# Patient Record
Sex: MALE | Race: WHITE | NOT HISPANIC OR LATINO | Employment: FULL TIME | URBAN - METROPOLITAN AREA
[De-identification: names, ages, dates, MRNs, and addresses within clinical notes are randomized per-mention and may not be internally consistent; named-entity substitution may affect disease eponyms.]

---

## 2023-05-24 ENCOUNTER — TELEPHONE (OUTPATIENT)
Dept: PRIMARY CARE | Facility: CLINIC | Age: 58
End: 2023-05-24
Payer: COMMERCIAL

## 2023-05-25 NOTE — TELEPHONE ENCOUNTER
Patient had pouchoscopy completed due to colectomy history. Records received from [facility] and chart updated

## 2023-06-06 ENCOUNTER — OFFICE VISIT (OUTPATIENT)
Dept: PRIMARY CARE | Facility: CLINIC | Age: 58
End: 2023-06-06
Payer: COMMERCIAL

## 2023-06-06 VITALS
HEIGHT: 67 IN | WEIGHT: 140 LBS | TEMPERATURE: 97.5 F | DIASTOLIC BLOOD PRESSURE: 84 MMHG | HEART RATE: 65 BPM | BODY MASS INDEX: 21.97 KG/M2 | SYSTOLIC BLOOD PRESSURE: 118 MMHG | OXYGEN SATURATION: 99 %

## 2023-06-06 DIAGNOSIS — K51.00 ULCERATIVE PANCOLITIS WITHOUT COMPLICATION (MULTI): Primary | ICD-10-CM

## 2023-06-06 DIAGNOSIS — M67.442 DIGITAL MUCINOUS CYST OF FINGER OF LEFT HAND: ICD-10-CM

## 2023-06-06 PROBLEM — R20.2 PARESTHESIA OF LEFT ARM: Status: ACTIVE | Noted: 2023-06-06

## 2023-06-06 PROBLEM — R21 RASH: Status: ACTIVE | Noted: 2023-06-06

## 2023-06-06 PROBLEM — F41.9 ANXIETY DISORDER: Status: ACTIVE | Noted: 2023-06-06

## 2023-06-06 PROBLEM — D64.9 ABSOLUTE ANEMIA: Status: ACTIVE | Noted: 2023-06-06

## 2023-06-06 PROBLEM — N52.9 ERECTILE DYSFUNCTION: Status: ACTIVE | Noted: 2023-06-06

## 2023-06-06 PROBLEM — G47.00 INSOMNIA: Status: ACTIVE | Noted: 2023-06-06

## 2023-06-06 PROBLEM — J01.90 ACUTE SINUSITIS: Status: RESOLVED | Noted: 2023-06-06 | Resolved: 2023-06-06

## 2023-06-06 PROBLEM — R63.0 POOR APPETITE: Status: ACTIVE | Noted: 2023-06-06

## 2023-06-06 PROBLEM — R97.20 ELEVATED PSA: Status: ACTIVE | Noted: 2022-12-07

## 2023-06-06 PROCEDURE — 20600 DRAIN/INJ JOINT/BURSA W/O US: CPT | Performed by: FAMILY MEDICINE

## 2023-06-06 PROCEDURE — 99212 OFFICE O/P EST SF 10 MIN: CPT | Performed by: FAMILY MEDICINE

## 2023-06-06 RX ORDER — ESCITALOPRAM OXALATE 10 MG/1
10 TABLET ORAL
COMMUNITY
Start: 2017-03-02

## 2023-06-06 RX ORDER — CLOCORTOLONE PIVALATE 0 G/G
CREAM TOPICAL
COMMUNITY
Start: 2020-02-05

## 2023-06-06 RX ORDER — TRAZODONE HYDROCHLORIDE 50 MG/1
50-100 TABLET ORAL NIGHTLY PRN
COMMUNITY
End: 2023-08-29

## 2023-06-06 RX ORDER — SILDENAFIL 100 MG/1
TABLET, FILM COATED ORAL
COMMUNITY
Start: 2021-02-19 | End: 2023-11-24 | Stop reason: SDUPTHER

## 2023-06-06 RX ORDER — CELECOXIB 200 MG/1
200 CAPSULE ORAL DAILY PRN
COMMUNITY
End: 2023-06-14

## 2023-06-06 RX ORDER — ETANERCEPT 50 MG/ML
SOLUTION SUBCUTANEOUS
COMMUNITY
Start: 2015-08-12

## 2023-06-06 RX ORDER — MIRTAZAPINE 45 MG/1
1 TABLET, FILM COATED ORAL NIGHTLY
COMMUNITY
Start: 2017-06-15

## 2023-06-06 RX ORDER — ZOLPIDEM TARTRATE 10 MG/1
1 TABLET ORAL NIGHTLY PRN
COMMUNITY
Start: 2017-09-25

## 2023-06-06 RX ORDER — MULTIVITAMIN
TABLET ORAL
COMMUNITY
Start: 2007-08-15

## 2023-06-06 ASSESSMENT — ENCOUNTER SYMPTOMS
FATIGUE: 0
DIZZINESS: 0
JOINT SWELLING: 0
FREQUENCY: 0
CONFUSION: 0
ABDOMINAL PAIN: 0
UNEXPECTED WEIGHT CHANGE: 0
DYSURIA: 0
SHORTNESS OF BREATH: 0
COUGH: 0
WEAKNESS: 0
SORE THROAT: 0
DECREASED CONCENTRATION: 0
NUMBNESS: 0
FEVER: 0
DIARRHEA: 0
BLOOD IN STOOL: 0
HEADACHES: 0
NAUSEA: 0
VOMITING: 0
PALPITATIONS: 0
HALLUCINATIONS: 0
EYE PAIN: 0
HEMATURIA: 0
TROUBLE SWALLOWING: 0

## 2023-06-06 ASSESSMENT — PAIN SCALES - GENERAL: PAINLEVEL: 2

## 2023-06-06 NOTE — PROGRESS NOTES
Subjective   Patient ID: Rj Adrian is a 58 y.o. male.    Patient with pain at the distal interphalangeal joint of the left index finger.  He thinks he injured it a few months ago and now there is a lump over the joint.  It is somewhat painful to move.  No swelling or redness or fever.  History of ulcerative colitis.  Under good control.  Scope is current.        Review of Systems   Constitutional:  Negative for fatigue, fever and unexpected weight change.   HENT:  Negative for congestion, ear pain, hearing loss, sore throat and trouble swallowing.    Eyes:  Negative for pain and visual disturbance.   Respiratory:  Negative for cough and shortness of breath.    Cardiovascular:  Negative for chest pain, palpitations and leg swelling.   Gastrointestinal:  Negative for abdominal pain, blood in stool, diarrhea, nausea and vomiting.   Genitourinary:  Negative for dysuria, frequency, hematuria and urgency.   Musculoskeletal:  Negative for joint swelling.   Skin:  Negative for pallor and rash.   Neurological:  Negative for dizziness, syncope, weakness, numbness and headaches.   Psychiatric/Behavioral:  Negative for confusion, decreased concentration, hallucinations and suicidal ideas.      Vitals:    06/06/23 1315   BP: 118/84   Pulse: 65   Temp: 36.4 °C (97.5 °F)   SpO2: 99%      Objective   Physical Exam  Constitutional:       Appearance: Normal appearance. He is normal weight.   Musculoskeletal:      Comments: Left index finger with small nodule over the DIP.  Cleaned with alcohol, injected with 2% lidocaine without epinephrine, inserted an 18-gauge needle and aspirated a scant amount of viscous clear fluid.  1/8 of a cc of Kenalog was injected and finger wrapped tightly   Neurological:      Mental Status: He is alert.         Assessment/Plan   Diagnoses and all orders for this visit:  Ulcerative pancolitis without complication (CMS/HCC)

## 2023-06-06 NOTE — PATIENT INSTRUCTIONS
Keep bandage and pressure over distal finger for the rest of the day.  Avoid overuse.  Hopefully the Kenalog will settle down the inflammation, pain and you will not get any more fluid in there.  If so let me know and we can further evaluate.  Follow-up with GI for inflammatory bowel disorder.  Diet as tolerated.

## 2023-06-08 PROBLEM — M67.442 DIGITAL MUCINOUS CYST OF FINGER OF LEFT HAND: Status: ACTIVE | Noted: 2023-06-08

## 2023-06-08 RX ORDER — TRIAMCINOLONE ACETONIDE 40 MG/ML
5 INJECTION, SUSPENSION INTRA-ARTICULAR; INTRAMUSCULAR ONCE
Status: COMPLETED | OUTPATIENT
Start: 2023-06-08 | End: 2023-06-08

## 2023-06-08 RX ADMIN — TRIAMCINOLONE ACETONIDE 5.2 MG: 40 INJECTION, SUSPENSION INTRA-ARTICULAR; INTRAMUSCULAR at 10:54

## 2023-06-13 DIAGNOSIS — R20.2 PARESTHESIA OF SKIN: ICD-10-CM

## 2023-06-14 RX ORDER — CELECOXIB 200 MG/1
CAPSULE ORAL
Qty: 90 CAPSULE | Refills: 3 | Status: SHIPPED | OUTPATIENT
Start: 2023-06-14

## 2023-06-16 ENCOUNTER — OFFICE VISIT (OUTPATIENT)
Dept: PRIMARY CARE | Facility: CLINIC | Age: 58
End: 2023-06-16
Payer: COMMERCIAL

## 2023-06-16 DIAGNOSIS — K91.850 POUCHITIS (MULTI): Primary | ICD-10-CM

## 2023-06-16 PROCEDURE — 99212 OFFICE O/P EST SF 10 MIN: CPT | Performed by: INTERNAL MEDICINE

## 2023-06-16 RX ORDER — CIPROFLOXACIN 500 MG/1
500 TABLET ORAL 2 TIMES DAILY
Qty: 14 TABLET | Refills: 0 | Status: SHIPPED | OUTPATIENT
Start: 2023-06-16 | End: 2023-06-23

## 2023-06-23 ASSESSMENT — ENCOUNTER SYMPTOMS
EYE REDNESS: 0
NAUSEA: 0
PHOTOPHOBIA: 0
NERVOUS/ANXIOUS: 0
APPETITE CHANGE: 0
WHEEZING: 0
BACK PAIN: 0
HEADACHES: 0
PALPITATIONS: 0
MYALGIAS: 0
VOMITING: 0
POLYDIPSIA: 0
POLYPHAGIA: 0
FREQUENCY: 0
HALLUCINATIONS: 0
RHINORRHEA: 0
EYE DISCHARGE: 0
COUGH: 0
DYSURIA: 0
CHEST TIGHTNESS: 0
SINUS PRESSURE: 0
ARTHRALGIAS: 0
BRUISES/BLEEDS EASILY: 0
COLOR CHANGE: 0
FLANK PAIN: 0
FEVER: 0
CONSTIPATION: 0
ABDOMINAL DISTENTION: 0
ACTIVITY CHANGE: 0
BLOOD IN STOOL: 0
NECK STIFFNESS: 0
CONFUSION: 0
NUMBNESS: 0
DIZZINESS: 0
SHORTNESS OF BREATH: 0
DIFFICULTY URINATING: 0
WEAKNESS: 0
TROUBLE SWALLOWING: 0
ABDOMINAL PAIN: 0
DIARRHEA: 0
FATIGUE: 0

## 2023-06-23 NOTE — PROGRESS NOTES
Telemedicine visit is conducted with the patient with his/ her consent about the medical problem as documented below.   Communication with the patient is over the telephone as per his request.    Subjective   Patient ID: Rj Adrian is a 58 y.o. male who called office for Pouchitis.     HPI   Patient had total colectomy in the past and keep getting pouchitis and he needed antibiotic every time and its been going on from several years.    Review of Systems   Constitutional:  Negative for activity change, appetite change, fatigue and fever.   HENT:  Negative for congestion, ear pain, hearing loss, rhinorrhea, sinus pressure and trouble swallowing.    Eyes:  Negative for photophobia, discharge, redness and visual disturbance.   Respiratory:  Negative for cough, chest tightness, shortness of breath and wheezing.    Cardiovascular:  Negative for chest pain, palpitations and leg swelling.   Gastrointestinal:  Negative for abdominal distention, abdominal pain, blood in stool, constipation, diarrhea, nausea and vomiting.   Endocrine: Negative for polydipsia, polyphagia and polyuria.   Genitourinary:  Negative for difficulty urinating, dysuria, flank pain, frequency and urgency.   Musculoskeletal:  Negative for arthralgias, back pain, myalgias and neck stiffness.   Skin:  Negative for color change and rash.   Allergic/Immunologic: Negative for immunocompromised state.   Neurological:  Negative for dizziness, syncope, weakness, numbness and headaches.   Hematological:  Does not bruise/bleed easily.   Psychiatric/Behavioral:  Negative for behavioral problems, confusion, hallucinations and suicidal ideas. The patient is not nervous/anxious.      Objective   There were no vitals taken for this visit.    Physical Exam    Assessment/Plan   Problem List Items Addressed This Visit    None  Visit Diagnoses       Pouchitis (CMS/[facility])    -  Primary    Relevant Medications    ciprofloxacin (Cipro) 500 mg tablet

## 2023-08-28 DIAGNOSIS — G47.00 INSOMNIA, UNSPECIFIED: ICD-10-CM

## 2023-08-29 RX ORDER — TRAZODONE HYDROCHLORIDE 50 MG/1
50-100 TABLET ORAL NIGHTLY PRN
Qty: 180 TABLET | Refills: 3 | Status: SHIPPED | OUTPATIENT
Start: 2023-08-29

## 2023-10-04 DIAGNOSIS — M45.9 ANKYLOSING SPONDYLITIS, UNSPECIFIED SITE OF SPINE (MULTI): ICD-10-CM

## 2023-11-22 DIAGNOSIS — N52.9 MALE ERECTILE DYSFUNCTION, UNSPECIFIED: ICD-10-CM

## 2023-11-24 RX ORDER — SILDENAFIL 100 MG/1
TABLET, FILM COATED ORAL
Qty: 30 TABLET | Refills: 4 | Status: SHIPPED | OUTPATIENT
Start: 2023-11-24

## 2023-12-01 ENCOUNTER — OFFICE VISIT (OUTPATIENT)
Dept: PRIMARY CARE | Facility: CLINIC | Age: 58
End: 2023-12-01
Payer: COMMERCIAL

## 2023-12-01 VITALS
HEIGHT: 67 IN | TEMPERATURE: 97.2 F | OXYGEN SATURATION: 97 % | SYSTOLIC BLOOD PRESSURE: 118 MMHG | DIASTOLIC BLOOD PRESSURE: 76 MMHG | HEART RATE: 75 BPM | BODY MASS INDEX: 22.6 KG/M2 | WEIGHT: 144 LBS

## 2023-12-01 DIAGNOSIS — K51.00 ULCERATIVE PANCOLITIS WITHOUT COMPLICATION (MULTI): ICD-10-CM

## 2023-12-01 DIAGNOSIS — J01.00 ACUTE NON-RECURRENT MAXILLARY SINUSITIS: ICD-10-CM

## 2023-12-01 DIAGNOSIS — M45.9 ANKYLOSING SPONDYLITIS, UNSPECIFIED SITE OF SPINE (MULTI): ICD-10-CM

## 2023-12-01 DIAGNOSIS — Z00.00 ROUTINE GENERAL MEDICAL EXAMINATION AT A HEALTH CARE FACILITY: Primary | ICD-10-CM

## 2023-12-01 PROCEDURE — 99396 PREV VISIT EST AGE 40-64: CPT | Performed by: FAMILY MEDICINE

## 2023-12-01 RX ORDER — AZITHROMYCIN 250 MG/1
TABLET, FILM COATED ORAL
Qty: 6 TABLET | Refills: 0 | Status: SHIPPED | OUTPATIENT
Start: 2023-12-01 | End: 2023-12-06

## 2023-12-05 PROBLEM — Z00.00 ROUTINE GENERAL MEDICAL EXAMINATION AT A HEALTH CARE FACILITY: Status: ACTIVE | Noted: 2023-12-05

## 2023-12-05 ASSESSMENT — ENCOUNTER SYMPTOMS
SORE THROAT: 0
DYSURIA: 0
DECREASED CONCENTRATION: 0
FREQUENCY: 0
HEADACHES: 0
COUGH: 0
WEAKNESS: 0
JOINT SWELLING: 0
FATIGUE: 0
SINUS PRESSURE: 1
DIZZINESS: 0
NAUSEA: 0
SINUS PAIN: 1
DIARRHEA: 0
NUMBNESS: 0
HEMATURIA: 0
VOMITING: 0
CONFUSION: 0
RHINORRHEA: 1
UNEXPECTED WEIGHT CHANGE: 0
BLOOD IN STOOL: 0
PALPITATIONS: 0
TROUBLE SWALLOWING: 0
EYE PAIN: 0
FEVER: 0
SHORTNESS OF BREATH: 0
ABDOMINAL PAIN: 0
HALLUCINATIONS: 0

## 2023-12-05 NOTE — PROGRESS NOTES
Subjective   Patient ID: Rj Adrian is a 58 y.o. male.    Patient comes in for yearly checkup.  He has a history of ankylosing spondylitis and ulcerative colitis that are under great control with [employer]olog[employer].  He is current on his colonoscopy.  He goes to the gym almost every day and eats a very healthy diet.  He does not smoke and he drinks socially.  He has no chest pain, shortness of breath, fever, chills or any unusual symptoms.  He is due for his annual labs.  He is getting to the dentist and the eye doctor.  He has been having thick nasal drainage with sinus pressure.  No fever or chills.  It is similar to his previous sinus infections.        Review of Systems   Constitutional:  Negative for fatigue, fever and unexpected weight change.   HENT:  Positive for rhinorrhea, sinus pressure and sinus pain. Negative for congestion, ear pain, hearing loss, sore throat and trouble swallowing.    Eyes:  Negative for pain and visual disturbance.   Respiratory:  Negative for cough and shortness of breath.    Cardiovascular:  Negative for chest pain, palpitations and leg swelling.   Gastrointestinal:  Negative for abdominal pain, blood in stool, diarrhea, nausea and vomiting.   Genitourinary:  Negative for dysuria, frequency, hematuria and urgency.   Musculoskeletal:  Negative for joint swelling.   Skin:  Negative for pallor and rash.   Neurological:  Negative for dizziness, syncope, weakness, numbness and headaches.   Psychiatric/Behavioral:  Negative for confusion, decreased concentration, hallucinations and suicidal ideas.      Vitals:    12/01/23 1142   BP: 118/76   Pulse: 75   Temp: 36.2 °C (97.2 °F)   SpO2: 97%      Objective   Physical Exam  Constitutional:       Appearance: Normal appearance. He is not toxic-appearing.   HENT:      Head: Normocephalic and atraumatic.      Right Ear: Tympanic membrane normal.      Left Ear: Tympanic membrane normal.      Nose: Congestion and rhinorrhea present.      Mouth/Throat:       Mouth: Mucous membranes are moist.      Pharynx: Oropharynx is clear. No oropharyngeal exudate or posterior oropharyngeal erythema.   Eyes:      General: No scleral icterus.     Extraocular Movements: Extraocular movements intact.      Conjunctiva/sclera: Conjunctivae normal.      Pupils: Pupils are equal, round, and reactive to light.   Cardiovascular:      Rate and Rhythm: Normal rate and regular rhythm.      Pulses: Normal pulses.   Pulmonary:      Effort: No respiratory distress.      Breath sounds: Normal breath sounds. No wheezing or rhonchi.   Abdominal:      General: Abdomen is flat. Bowel sounds are normal.      Palpations: Abdomen is soft. There is no mass.      Tenderness: There is no abdominal tenderness. There is no guarding.   Musculoskeletal:         General: Normal range of motion.      Cervical back: No rigidity or tenderness.   Lymphadenopathy:      Cervical: No cervical adenopathy.   Skin:     General: Skin is warm and dry.      Findings: No rash.   Neurological:      General: No focal deficit present.      Mental Status: He is alert and oriented to person, place, and time.      Cranial Nerves: No cranial nerve deficit.      Motor: No weakness.   Psychiatric:         Mood and Affect: Mood normal.         Behavior: Behavior normal.         Thought Content: Thought content normal.         Assessment/Plan   Diagnoses and all orders for this visit:  Routine general medical examination at a health care facility  -     CBC and Auto Differential; Future  -     Comprehensive Metabolic Panel; Future  -     Lipid Panel; Future  -     Prostate Specific Antigen, Screen; Future  -     Thyroid Stimulating Hormone; Future  -     Urinalysis with Reflex Microscopic; Future  -     Vitamin D 25-Hydroxy,Total (for eval of Vitamin D levels); Future  Ankylosing spondylitis, unspecified site of spine (CMS/HCC)  Ulcerative pancolitis without complication (CMS/HCC)  Acute non-recurrent maxillary sinusitis  -      azithromycin (Zithromax) 250 mg tablet; Take 2 tablets (500 mg) by mouth once daily for 1 day, THEN 1 tablet (250 mg) once daily for 4 days. Take 2 tabs (500 mg) by mouth today, than 1 daily for 4 days..

## 2023-12-05 NOTE — PATIENT INSTRUCTIONS
It was nice to see you today!  Discussed current concerns and addressed   Reviewed recent labs and diagnostics  Reviewed medications list  Continue to eat a healthy diet, exercise at least 3 times a week or more  Plan and follow up discussed  For any further information related to your condition, copy and paste or go to [email or web address]  Doing great  Treat sinus infection and get fasting labs.

## 2024-01-05 ENCOUNTER — LAB (OUTPATIENT)
Dept: LAB | Facility: LAB | Age: 59
End: 2024-01-05
Payer: COMMERCIAL

## 2024-01-05 DIAGNOSIS — Z00.00 ROUTINE GENERAL MEDICAL EXAMINATION AT A HEALTH CARE FACILITY: ICD-10-CM

## 2024-01-05 LAB
25(OH)D3 SERPL-MCNC: 32 NG/ML (ref 30–100)
ALBUMIN SERPL BCP-MCNC: 3.8 G/DL (ref 3.4–5)
ALP SERPL-CCNC: 69 U/L (ref 33–120)
ALT SERPL W P-5'-P-CCNC: 12 U/L (ref 10–52)
ANION GAP SERPL CALC-SCNC: 15 MMOL/L (ref 10–20)
APPEARANCE UR: ABNORMAL
AST SERPL W P-5'-P-CCNC: 18 U/L (ref 9–39)
BASOPHILS # BLD AUTO: 0.03 X10*3/UL (ref 0–0.1)
BASOPHILS NFR BLD AUTO: 0.5 %
BILIRUB SERPL-MCNC: 0.3 MG/DL (ref 0–1.2)
BILIRUB UR STRIP.AUTO-MCNC: NEGATIVE MG/DL
BUN SERPL-MCNC: 11 MG/DL (ref 6–23)
CALCIUM SERPL-MCNC: 8.6 MG/DL (ref 8.6–10.3)
CHLORIDE SERPL-SCNC: 105 MMOL/L (ref 98–107)
CHOLEST SERPL-MCNC: 181 MG/DL (ref 0–199)
CHOLESTEROL/HDL RATIO: 3.9
CO2 SERPL-SCNC: 24 MMOL/L (ref 21–32)
COLOR UR: YELLOW
CREAT SERPL-MCNC: 0.83 MG/DL (ref 0.5–1.3)
EOSINOPHIL # BLD AUTO: 0.39 X10*3/UL (ref 0–0.7)
EOSINOPHIL NFR BLD AUTO: 6.7 %
ERYTHROCYTE [DISTWIDTH] IN BLOOD BY AUTOMATED COUNT: 13 % (ref 11.5–14.5)
GFR SERPL CREATININE-BSD FRML MDRD: >90 ML/MIN/1.73M*2
GLUCOSE SERPL-MCNC: 92 MG/DL (ref 74–99)
GLUCOSE UR STRIP.AUTO-MCNC: NEGATIVE MG/DL
HCT VFR BLD AUTO: 41.4 % (ref 41–52)
HDLC SERPL-MCNC: 46 MG/DL
HGB BLD-MCNC: 13.6 G/DL (ref 13.5–17.5)
IMM GRANULOCYTES # BLD AUTO: 0.01 X10*3/UL (ref 0–0.7)
IMM GRANULOCYTES NFR BLD AUTO: 0.2 % (ref 0–0.9)
KETONES UR STRIP.AUTO-MCNC: NEGATIVE MG/DL
LDLC SERPL CALC-MCNC: 119 MG/DL
LEUKOCYTE ESTERASE UR QL STRIP.AUTO: NEGATIVE
LYMPHOCYTES # BLD AUTO: 1.01 X10*3/UL (ref 1.2–4.8)
LYMPHOCYTES NFR BLD AUTO: 17.3 %
MCH RBC QN AUTO: 27.1 PG (ref 26–34)
MCHC RBC AUTO-ENTMCNC: 32.9 G/DL (ref 32–36)
MCV RBC AUTO: 83 FL (ref 80–100)
MONOCYTES # BLD AUTO: 0.71 X10*3/UL (ref 0.1–1)
MONOCYTES NFR BLD AUTO: 12.2 %
NEUTROPHILS # BLD AUTO: 3.69 X10*3/UL (ref 1.2–7.7)
NEUTROPHILS NFR BLD AUTO: 63.1 %
NITRITE UR QL STRIP.AUTO: NEGATIVE
NON HDL CHOLESTEROL: 135 MG/DL (ref 0–149)
NRBC BLD-RTO: 0 /100 WBCS (ref 0–0)
PH UR STRIP.AUTO: 5 [PH]
PLATELET # BLD AUTO: 310 X10*3/UL (ref 150–450)
POTASSIUM SERPL-SCNC: 3.9 MMOL/L (ref 3.5–5.3)
PROT SERPL-MCNC: 6.5 G/DL (ref 6.4–8.2)
PROT UR STRIP.AUTO-MCNC: NEGATIVE MG/DL
PSA SERPL-MCNC: 4.51 NG/ML
RBC # BLD AUTO: 5.01 X10*6/UL (ref 4.5–5.9)
RBC # UR STRIP.AUTO: NEGATIVE /UL
SODIUM SERPL-SCNC: 140 MMOL/L (ref 136–145)
SP GR UR STRIP.AUTO: 1.02
TRIGL SERPL-MCNC: 81 MG/DL (ref 0–149)
TSH SERPL-ACNC: 1.92 MIU/L (ref 0.44–3.98)
UROBILINOGEN UR STRIP.AUTO-MCNC: <2 MG/DL
VLDL: 16 MG/DL (ref 0–40)
WBC # BLD AUTO: 5.8 X10*3/UL (ref 4.4–11.3)

## 2024-01-05 PROCEDURE — 81003 URINALYSIS AUTO W/O SCOPE: CPT

## 2024-01-05 PROCEDURE — 80061 LIPID PANEL: CPT

## 2024-01-05 PROCEDURE — 85025 COMPLETE CBC W/AUTO DIFF WBC: CPT

## 2024-01-05 PROCEDURE — 80053 COMPREHEN METABOLIC PANEL: CPT

## 2024-01-05 PROCEDURE — 84153 ASSAY OF PSA TOTAL: CPT

## 2024-01-05 PROCEDURE — 84443 ASSAY THYROID STIM HORMONE: CPT

## 2024-01-05 PROCEDURE — 82306 VITAMIN D 25 HYDROXY: CPT

## 2024-01-05 PROCEDURE — 36415 COLL VENOUS BLD VENIPUNCTURE: CPT

## 2024-01-29 DIAGNOSIS — R97.20 ELEVATED PSA: Primary | ICD-10-CM

## 2024-04-25 ENCOUNTER — TELEPHONE (OUTPATIENT)
Dept: PRIMARY CARE | Facility: CLINIC | Age: 59
End: 2024-04-25
Payer: COMMERCIAL

## 2024-04-25 NOTE — TELEPHONE ENCOUNTER
Pt called requesting a refill on cipro.  Also stated PSA went a point and wants to know if that is alarming?

## 2024-04-26 DIAGNOSIS — K51.00 ULCERATIVE PANCOLITIS WITHOUT COMPLICATION (MULTI): Primary | ICD-10-CM

## 2024-04-26 RX ORDER — CIPROFLOXACIN 500 MG/1
500 TABLET ORAL 2 TIMES DAILY
Qty: 20 TABLET | Refills: 0 | Status: SHIPPED | OUTPATIENT
Start: 2024-04-26 | End: 2024-05-06

## 2024-04-26 NOTE — TELEPHONE ENCOUNTER
Patient left voicemail stating antibiotic called in. Per Dr. Moe, needs to follow up with urology for recent labs. Can contact office Monday after 8 if any further questions.

## 2024-06-17 DIAGNOSIS — R20.2 PARESTHESIA OF SKIN: ICD-10-CM

## 2024-06-17 PROBLEM — M67.449 DIGITAL MUCOUS CYST: Status: ACTIVE | Noted: 2023-06-08

## 2024-06-17 PROBLEM — K91.850 ILEAL POUCHITIS (MULTI): Status: ACTIVE | Noted: 2024-06-17

## 2024-06-17 PROBLEM — D50.9 IRON DEFICIENCY ANEMIA: Status: ACTIVE | Noted: 2024-06-17

## 2024-06-17 RX ORDER — CELECOXIB 200 MG/1
200 CAPSULE ORAL DAILY PRN
Qty: 90 CAPSULE | Refills: 3 | Status: SHIPPED | OUTPATIENT
Start: 2024-06-17

## 2024-06-17 RX ORDER — VENLAFAXINE HYDROCHLORIDE 75 MG/1
CAPSULE, EXTENDED RELEASE ORAL
COMMUNITY
Start: 2020-02-28

## 2024-07-18 ENCOUNTER — TELEPHONE (OUTPATIENT)
Dept: PRIMARY CARE | Facility: CLINIC | Age: 59
End: 2024-07-18
Payer: COMMERCIAL

## 2024-07-18 DIAGNOSIS — K51.00 ULCERATIVE PANCOLITIS WITHOUT COMPLICATION (MULTI): Primary | ICD-10-CM

## 2024-07-18 RX ORDER — CIPROFLOXACIN 500 MG/1
500 TABLET ORAL 2 TIMES DAILY
Qty: 20 TABLET | Refills: 0 | Status: SHIPPED | OUTPATIENT
Start: 2024-07-18 | End: 2024-07-28

## 2024-07-18 NOTE — TELEPHONE ENCOUNTER
Patient calling for a refill on cipro. States having stomach issues. Last given end of April for ulcerative pancolitis without complication. Do you need to see him? Referral to GI?

## 2024-09-16 DIAGNOSIS — K51.00 ULCERATIVE PANCOLITIS WITHOUT COMPLICATION (MULTI): Primary | ICD-10-CM

## 2024-09-16 RX ORDER — ETANERCEPT 50 MG/ML
SOLUTION SUBCUTANEOUS
Qty: 4 ML | Refills: 11 | Status: SHIPPED | OUTPATIENT
Start: 2024-09-16

## 2024-10-17 ENCOUNTER — TELEPHONE (OUTPATIENT)
Dept: PRIMARY CARE | Facility: CLINIC | Age: 59
End: 2024-10-17
Payer: COMMERCIAL

## 2024-10-18 DIAGNOSIS — K52.9 INFLAMMATORY BOWEL DISEASE: Primary | ICD-10-CM

## 2024-10-18 RX ORDER — CIPROFLOXACIN 500 MG/1
500 TABLET ORAL 2 TIMES DAILY
Qty: 20 TABLET | Refills: 0 | Status: SHIPPED | OUTPATIENT
Start: 2024-10-18 | End: 2024-10-28

## 2025-02-11 ENCOUNTER — OFFICE VISIT (OUTPATIENT)
Dept: PRIMARY CARE | Facility: CLINIC | Age: 60
End: 2025-02-11
Payer: COMMERCIAL

## 2025-02-11 VITALS
OXYGEN SATURATION: 97 % | WEIGHT: 141 LBS | SYSTOLIC BLOOD PRESSURE: 120 MMHG | BODY MASS INDEX: 22.08 KG/M2 | HEART RATE: 76 BPM | TEMPERATURE: 97.3 F | DIASTOLIC BLOOD PRESSURE: 64 MMHG

## 2025-02-11 DIAGNOSIS — G47.00 INSOMNIA, UNSPECIFIED: ICD-10-CM

## 2025-02-11 DIAGNOSIS — H00.015 HORDEOLUM EXTERNUM OF LEFT LOWER EYELID: Primary | ICD-10-CM

## 2025-02-11 PROCEDURE — 99213 OFFICE O/P EST LOW 20 MIN: CPT | Performed by: FAMILY MEDICINE

## 2025-02-11 RX ORDER — TRAZODONE HYDROCHLORIDE 50 MG/1
50-100 TABLET ORAL NIGHTLY PRN
Qty: 180 TABLET | Refills: 3 | Status: SHIPPED | OUTPATIENT
Start: 2025-02-11

## 2025-02-11 RX ORDER — TOBRAMYCIN 3 MG/ML
2 SOLUTION/ DROPS OPHTHALMIC 4 TIMES DAILY
Qty: 5 ML | Refills: 0 | Status: SHIPPED | OUTPATIENT
Start: 2025-02-11 | End: 2025-02-25

## 2025-02-11 ASSESSMENT — ENCOUNTER SYMPTOMS
NAUSEA: 0
UNEXPECTED WEIGHT CHANGE: 0
TROUBLE SWALLOWING: 0
NUMBNESS: 0
HEMATURIA: 0
COUGH: 0
EYE DISCHARGE: 1
SHORTNESS OF BREATH: 0
DECREASED CONCENTRATION: 0
HEADACHES: 0
WEAKNESS: 0
DIARRHEA: 0
VOMITING: 0
DYSURIA: 0
DIZZINESS: 0
EYE PAIN: 1
SORE THROAT: 0
FEVER: 0
BLOOD IN STOOL: 0
FREQUENCY: 0
FATIGUE: 0
EYE REDNESS: 1
PALPITATIONS: 0
CONFUSION: 0
HALLUCINATIONS: 0
ABDOMINAL PAIN: 0
JOINT SWELLING: 0

## 2025-02-11 ASSESSMENT — PAIN SCALES - GENERAL: PAINLEVEL_OUTOF10: 0-NO PAIN

## 2025-02-11 NOTE — PROGRESS NOTES
Subjective   Patient ID: Rj Adrian is a 59 y.o. male.    Patient with swelling of L lower eye lid some redness and pain        Review of Systems   Constitutional:  Negative for fatigue, fever and unexpected weight change.   HENT:  Negative for congestion, ear discharge, ear pain, hearing loss, sore throat and trouble swallowing.    Eyes:  Positive for pain, discharge and redness. Negative for visual disturbance.   Respiratory:  Negative for cough and shortness of breath.    Cardiovascular:  Negative for chest pain, palpitations and leg swelling.   Gastrointestinal:  Negative for abdominal pain, blood in stool, diarrhea, nausea and vomiting.   Genitourinary:  Negative for dysuria, frequency, hematuria and urgency.   Musculoskeletal:  Negative for joint swelling.   Skin:  Negative for pallor and rash.   Neurological:  Negative for dizziness, syncope, weakness, numbness and headaches.   Psychiatric/Behavioral:  Negative for confusion, decreased concentration, hallucinations and suicidal ideas.      Vitals:    02/11/25 0849   BP: 120/64   Pulse: 76   Temp: 36.3 °C (97.3 °F)   SpO2: 97%      Objective   Physical Exam  Constitutional:       General: He is not in acute distress.     Appearance: He is not toxic-appearing.   Eyes:      Comments: L lower lid with redness and swelling on palpebral conjunctiva. No pus. Sclera slightly red.    Neurological:      General: No focal deficit present.      Mental Status: He is alert and oriented to person, place, and time.   Psychiatric:         Mood and Affect: Mood normal.         Behavior: Behavior normal.         Thought Content: Thought content normal.         Judgment: Judgment normal.             Assessment/Plan   Diagnoses and all orders for this visit:  Hordeolum externum of left lower eyelid  -     tobramycin (Tobrex) 0.3 % ophthalmic solution; Administer 2 drops into the left eye 4 times a day for 14 days.  Insomnia, unspecified  -     traZODone (Desyrel) 50 mg tablet;  Take 1-2 tablets ( mg) by mouth as needed at bedtime for sleep.

## 2025-02-11 NOTE — PATIENT INSTRUCTIONS
Patient is forming a stye. Discussed hot compresses. Will give antibiotic gtts to help it go away quicker. If no better in a week let me know. RF trazodone

## 2025-02-14 ENCOUNTER — HOSPITAL ENCOUNTER (EMERGENCY)
Facility: HOSPITAL | Age: 60
Discharge: HOME | End: 2025-02-15
Attending: STUDENT IN AN ORGANIZED HEALTH CARE EDUCATION/TRAINING PROGRAM
Payer: COMMERCIAL

## 2025-02-14 ENCOUNTER — APPOINTMENT (OUTPATIENT)
Dept: RADIOLOGY | Facility: HOSPITAL | Age: 60
End: 2025-02-14
Payer: COMMERCIAL

## 2025-02-14 DIAGNOSIS — S29.9XXA RIB INJURY: ICD-10-CM
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
## 2025-02-14 PROCEDURE — 99284 EMERGENCY DEPT VISIT MOD MDM: CPT | Performed by: STUDENT IN AN ORGANIZED HEALTH CARE EDUCATION/TRAINING PROGRAM

## 2025-02-14 PROCEDURE — 71101 X-RAY EXAM UNILAT RIBS/CHEST: CPT | Mod: RT

## 2025-02-14 RX ORDER — LIDOCAINE 560 MG/1
1 PATCH PERCUTANEOUS; TOPICAL; TRANSDERMAL ONCE
Status: DISCONTINUED | OUTPATIENT
Start: 2025-02-14 | End: 2025-02-15 | Stop reason: HOSPADM

## 2025-02-14 RX ORDER — KETOROLAC TROMETHAMINE 15 MG/ML
15 INJECTION, SOLUTION INTRAMUSCULAR; INTRAVENOUS ONCE
Status: COMPLETED | OUTPATIENT
Start: 2025-02-14 | End: 2025-02-15

## 2025-02-14 ASSESSMENT — PAIN DESCRIPTION - LOCATION: LOCATION: RIB CAGE

## 2025-02-14 ASSESSMENT — COLUMBIA-SUICIDE SEVERITY RATING SCALE - C-SSRS
6. HAVE YOU EVER DONE ANYTHING, STARTED TO DO ANYTHING, OR PREPARED TO DO ANYTHING TO END YOUR LIFE?: NO
2. HAVE YOU ACTUALLY HAD ANY THOUGHTS OF KILLING YOURSELF?: NO
1. IN THE PAST MONTH, HAVE YOU WISHED YOU WERE DEAD OR WISHED YOU COULD GO TO SLEEP AND NOT WAKE UP?: NO

## 2025-02-14 ASSESSMENT — PAIN DESCRIPTION - PAIN TYPE: TYPE: ACUTE PAIN

## 2025-02-14 ASSESSMENT — PAIN SCALES - GENERAL
PAINLEVEL_OUTOF10: 6
PAINLEVEL_OUTOF10: 0 - NO PAIN

## 2025-02-14 ASSESSMENT — PAIN - FUNCTIONAL ASSESSMENT: PAIN_FUNCTIONAL_ASSESSMENT: 0-10

## 2025-02-14 ASSESSMENT — LIFESTYLE VARIABLES
TOTAL SCORE: 0
HAVE YOU EVER FELT YOU SHOULD CUT DOWN ON YOUR DRINKING: NO
EVER HAD A DRINK FIRST THING IN THE MORNING TO STEADY YOUR NERVES TO GET RID OF A HANGOVER: NO
HAVE PEOPLE ANNOYED YOU BY CRITICIZING YOUR DRINKING: NO
EVER FELT BAD OR GUILTY ABOUT YOUR DRINKING: NO

## 2025-02-14 ASSESSMENT — PAIN DESCRIPTION - ORIENTATION: ORIENTATION: RIGHT

## 2025-02-15 VITALS
SYSTOLIC BLOOD PRESSURE: 148 MMHG | WEIGHT: 150 LBS | HEIGHT: 67 IN | HEART RATE: 98 BPM | OXYGEN SATURATION: 98 % | RESPIRATION RATE: 16 BRPM | BODY MASS INDEX: 23.54 KG/M2 | DIASTOLIC BLOOD PRESSURE: 65 MMHG | TEMPERATURE: 97.7 F

## 2025-02-15 PROCEDURE — 2500000005 HC RX 250 GENERAL PHARMACY W/O HCPCS: Performed by: HEALTH CARE PROVIDER

## 2025-02-15 PROCEDURE — 96372 THER/PROPH/DIAG INJ SC/IM: CPT | Performed by: HEALTH CARE PROVIDER

## 2025-02-15 PROCEDURE — 71101 X-RAY EXAM UNILAT RIBS/CHEST: CPT | Mod: RIGHT SIDE | Performed by: RADIOLOGY

## 2025-02-15 PROCEDURE — 2500000004 HC RX 250 GENERAL PHARMACY W/ HCPCS (ALT 636 FOR OP/ED): Performed by: HEALTH CARE PROVIDER

## 2025-02-15 RX ORDER — LIDOCAINE 50 MG/G
1 PATCH TOPICAL DAILY
Qty: 5 PATCH | Refills: 0 | Status: SHIPPED | OUTPATIENT
Start: 2025-02-15

## 2025-02-15 RX ORDER — GUAIFENESIN 600 MG/1
1200 TABLET, EXTENDED RELEASE ORAL 2 TIMES DAILY
Qty: 28 TABLET | Refills: 0 | Status: SHIPPED | OUTPATIENT
Start: 2025-02-15 | End: 2025-02-22

## 2025-02-15 RX ORDER — TRAMADOL HYDROCHLORIDE 50 MG/1
50 TABLET ORAL EVERY 6 HOURS PRN
Qty: 8 TABLET | Refills: 0 | Status: SHIPPED | OUTPATIENT
Start: 2025-02-15 | End: 2025-02-17

## 2025-02-15 RX ADMIN — LIDOCAINE 4% 1 PATCH: 40 PATCH TOPICAL at 00:09

## 2025-02-15 RX ADMIN — KETOROLAC TROMETHAMINE 15 MG: 15 INJECTION, SOLUTION INTRAMUSCULAR; INTRAVENOUS at 00:10

## 2025-02-15 ASSESSMENT — PAIN SCALES - GENERAL: PAINLEVEL_OUTOF10: 0 - NO PAIN

## 2025-02-15 NOTE — ED PROVIDER NOTES
HPI   Chief Complaint   Patient presents with    Fall     Pt fell on the ice and landed on the Rt side of his body. Pt c/o Rt sided rib pain.       CC: Status post fall on ice  HPI:   59-year-old male presents ED complaining of right lateral posterior rib pain around the sick seventh and eighth ribs after he slipped on ice falling on his right side.  Patient denies any history of COPD, CHF, asthma or tobacco use.  He denies any head injury, denies any headache, dizziness or cervical neck tenderness.  Denies any history of interstitial lung disease.  He does not take any long-term anticoagulant or antiplatelet medications.  He denies any midline thoracic, lumbar tenderness.  He denies any loss of consciousness.    Additional Limitations to History:   External Records Reviewed: I reviewed recent and relevant outside records including   History Obtained From:     Past Medical History: Per HPI  Medications: Reviewed in EMR and with patient  Allergies:  Reviewed in EMR  Past Surgical History:   Social History:     ------------------------------------------------------------------------------------------------------  Physical Exam:  --Vital signs reviewed in nursing triage note, EMR flow sheets, and at patient's bedside  GEN:  A&Ox3, no acute distress, appears comfortable.  Conversational and appropriate.  No confusion or gross mental status changes.  EYES: EOMI, non-injected sclera.  ENT: Moist mucous membranes, no apparent injuries or lesions.   CARDIO: Normal rate and regular rhythm. No murmurs, rubs, or gallops.  2+ equal pulses of the distal extremities.   PULM: Clear to auscultation bilaterally. No rales, rhonchi, or wheezes. Good symmetric chest expansion.  Mild tenderness with palpation over the right posterior lateral ribs, no obvious soft tissue swelling, bruising  GI: Soft, non-tender, non-distended. No rebound tenderness or guarding.  SKIN: Warm and dry, no rashes or lesions.  MSK: ROM intact the extremities  without contractures.   EXT: No peripheral edema, contusions, or wounds.   NEURO: Cranial nerves II-XII grossly intact. Sensation to light touch intact and equal bilaterally in upper and lower extremities.  Symmetric 5/5 strength in upper and lower extremities.  PSYCH: Appropriate mood and behavior, converses and responds appropriately during exam.  -------------------------------------------------------------------------------------------------------      Differential Diagnoses Considered:   Chronic Medical Conditions Significantly Affecting Care:   Diagnostic testing considered: [PERC, D-Dimer, PECARN, etc.]      - I independently interpreted: [CXR, CT, POCUS, etc. including your interpretation]  - Labs notable for     Escalation of Care: Appropriate for   Social Determinants of Health Significantly Affecting Care: [Homelessness, lacking transportation, uninsured, unable to afford medications]  Prescription Drug Consideration: [Antibiotics, antivirals, pain medications, etc.]  Discussion of Management with Other Providers:  I discussed the patient/results with: [admitting team, consultant, radiologist, social work, EPAT, case management, PT/OT, RT, PCP, etc.]      Dmitriy Hart PA-C              Patient History   Past Medical History:   Diagnosis Date    Other iron deficiency anemias 10/15/2018    Other iron deficiency anemia    Ulcerative colitis, unspecified, without complications ([facility]) 10/14/2022    Ulcerative colitis     History reviewed. No pertinent surgical history.  No family history on file.  Social History     Tobacco Use    Smoking status: Never    Smokeless tobacco: Never   Vaping Use    Vaping status: Never Used   Substance Use Topics    Alcohol use: Yes     Comment: occ    Drug use: Never       Physical Exam   ED Triage Vitals [02/14/25 2340]   Temperature Heart Rate Respirations BP   36.5 °C (97.7 °F) (!) 102 18 160/89      Pulse Ox Temp Source Heart Rate Source Patient Position   98 % Temporal --  --      BP Location FiO2 (%)     -- --       Physical Exam      ED Course & MDM                  No data recorded     Todd Coma Scale Score: 15 (02/14/25 0285 : Odalis Huston RN)                           Medical Decision Making      Procedure  Procedures     Dmitriy Hart PA-C  02/15/25 0023

## 2025-04-01 ENCOUNTER — TELEPHONE (OUTPATIENT)
Dept: PRIMARY CARE | Facility: CLINIC | Age: 60
End: 2025-04-01
Payer: COMMERCIAL

## 2025-04-02 DIAGNOSIS — K91.850 ILEAL POUCHITIS (MULTI): Primary | ICD-10-CM

## 2025-04-02 RX ORDER — CIPROFLOXACIN 500 MG/1
500 TABLET ORAL 2 TIMES DAILY
Qty: 20 TABLET | Refills: 0 | Status: SHIPPED | OUTPATIENT
Start: 2025-04-02 | End: 2025-04-12

## 2025-04-29 DIAGNOSIS — R20.2 PARESTHESIA OF SKIN: ICD-10-CM

## 2025-04-30 RX ORDER — CELECOXIB 200 MG/1
200 CAPSULE ORAL DAILY PRN
Qty: 90 CAPSULE | Refills: 3 | Status: SHIPPED | OUTPATIENT
Start: 2025-04-30

## 2025-05-19 ENCOUNTER — APPOINTMENT (OUTPATIENT)
Dept: PRIMARY CARE | Facility: CLINIC | Age: 60
End: 2025-05-19
Payer: COMMERCIAL

## 2025-05-19 VITALS
WEIGHT: 143 LBS | HEART RATE: 63 BPM | SYSTOLIC BLOOD PRESSURE: 120 MMHG | OXYGEN SATURATION: 98 % | BODY MASS INDEX: 22.4 KG/M2 | TEMPERATURE: 96.4 F | DIASTOLIC BLOOD PRESSURE: 70 MMHG

## 2025-05-19 DIAGNOSIS — L82.1 SEBORRHEIC KERATOSIS: ICD-10-CM

## 2025-05-19 DIAGNOSIS — K51.919 ULCERATIVE COLITIS WITH COMPLICATION, UNSPECIFIED LOCATION (MULTI): ICD-10-CM

## 2025-05-19 DIAGNOSIS — K51.019 ULCERATIVE PANCOLITIS WITH COMPLICATION (MULTI): ICD-10-CM

## 2025-05-19 DIAGNOSIS — M45.9 ANKYLOSING SPONDYLITIS, UNSPECIFIED SITE OF SPINE (MULTI): ICD-10-CM

## 2025-05-19 DIAGNOSIS — Z63.0 MARITAL DYSFUNCTION: ICD-10-CM

## 2025-05-19 DIAGNOSIS — K51.00 ULCERATIVE PANCOLITIS WITHOUT COMPLICATION (MULTI): ICD-10-CM

## 2025-05-19 DIAGNOSIS — M19.049 OSTEOARTHRITIS OF METACARPOPHALANGEAL (MCP) JOINT: Primary | ICD-10-CM

## 2025-05-19 PROCEDURE — 99213 OFFICE O/P EST LOW 20 MIN: CPT | Performed by: FAMILY MEDICINE

## 2025-05-19 PROCEDURE — 1036F TOBACCO NON-USER: CPT | Performed by: FAMILY MEDICINE

## 2025-05-19 SDOH — SOCIAL STABILITY - SOCIAL INSECURITY: PROBLEMS IN RELATIONSHIP WITH SPOUSE OR PARTNER: Z63.0

## 2025-05-19 ASSESSMENT — ENCOUNTER SYMPTOMS
TROUBLE SWALLOWING: 0
FEVER: 0
DYSURIA: 0
FATIGUE: 0
NAUSEA: 0
SHORTNESS OF BREATH: 0
BLOOD IN STOOL: 0
WEAKNESS: 0
VOMITING: 0
ABDOMINAL PAIN: 0
BRUISES/BLEEDS EASILY: 0
COUGH: 0
HEMATURIA: 0
DYSPHORIC MOOD: 0
TREMORS: 0
PALPITATIONS: 0
ARTHRALGIAS: 1
SINUS PAIN: 0
UNEXPECTED WEIGHT CHANGE: 0
JOINT SWELLING: 0
DIARRHEA: 0
LIGHT-HEADEDNESS: 0

## 2025-05-19 ASSESSMENT — PATIENT HEALTH QUESTIONNAIRE - PHQ9
SUM OF ALL RESPONSES TO PHQ9 QUESTIONS 1 AND 2: 0
2. FEELING DOWN, DEPRESSED OR HOPELESS: NOT AT ALL
1. LITTLE INTEREST OR PLEASURE IN DOING THINGS: NOT AT ALL

## 2025-05-19 ASSESSMENT — PAIN SCALES - GENERAL: PAINLEVEL_OUTOF10: 8

## 2025-05-19 NOTE — PROGRESS NOTES
Subjective   Patient ID: Rj Adrian is a 59 y.o. male.    Patient comes in with left thumb pain and feels like it is dislocating.  There was no injury or trauma.  The pain radiates up the left upper extremity to the shoulder.  He states it is slightly swollen, there is no redness or warmth and he has not had a fever.  He has inflammatory bowel disease, and there is mention in the chart of ankylosing spondylitis.  He would like me to look at his ball on the leg.  No significant changes.  Also has been going through some issues with his marriage and would like a counselor.        Review of Systems   Constitutional:  Negative for fatigue, fever and unexpected weight change.   HENT:  Negative for congestion, ear pain, nosebleeds, sinus pain and trouble swallowing.    Eyes:  Negative for visual disturbance.   Respiratory:  Negative for cough and shortness of breath.    Cardiovascular:  Negative for chest pain and palpitations.   Gastrointestinal:  Negative for abdominal pain, blood in stool, diarrhea, nausea and vomiting.   Genitourinary:  Negative for dysuria and hematuria.   Musculoskeletal:  Positive for arthralgias. Negative for gait problem and joint swelling.   Neurological:  Negative for tremors, weakness and light-headedness.   Hematological:  Does not bruise/bleed easily.   Psychiatric/Behavioral:  Negative for dysphoric mood and suicidal ideas.      Vitals:    05/19/25 0758   BP: 120/70   Pulse: 63   Temp: 35.8 °C (96.4 °F)   SpO2: 98%      Body mass index is 22.4 kg/m².  Objective   Physical Exam  Constitutional:       General: He is not in acute distress.     Appearance: He is not toxic-appearing.   Musculoskeletal:      Comments: There is some laxity of the first MCP joint.  There is mild swelling and tenderness.  No erythema.  There is reproduction of the pain with opposition.   Skin:     Comments: Rough velvety annular subcentimeter lesion on the left leg.  Some pigment changes that are brown, no black,  "red, blue or significant abnormality   Neurological:      General: No focal deficit present.      Mental Status: He is alert and oriented to person, place, and time.   Psychiatric:         Mood and Affect: Mood normal.         Behavior: Behavior normal.         Thought Content: Thought content normal.         Judgment: Judgment normal.         Last Labs:     CMP:   Lab Results   Component Value Date    CALCIUM 8.6 01/05/2024    PROT 6.5 01/05/2024    ALBUMIN 3.8 01/05/2024    AST 18 01/05/2024    ALKPHOS 69 01/05/2024    BILITOT 0.3 01/05/2024     CBC:   Lab Results   Component Value Date    WBC 5.8 01/05/2024    HGB 13.6 01/05/2024    HCT 41.4 01/05/2024    MCV 83 01/05/2024     01/05/2024     A1C:   No results found for: \"HGBA1C\"  LIPID PANEL:   Lab Results   Component Value Date    CHOL 181 01/05/2024    TRIG 81 01/05/2024    HDL 46.0 01/05/2024    CHHDL 3.9 01/05/2024    VLDL 16 01/05/2024    NHDL 135 01/05/2024     TSH:   Lab Results   Component Value Date    TSH 1.92 01/05/2024     PSA:   No results found for: \"PSA\"     Assessment/Plan   There are no diagnoses linked to this encounter.    "

## 2025-05-28 ENCOUNTER — TELEPHONE (OUTPATIENT)
Dept: PRIMARY CARE | Facility: CLINIC | Age: 60
End: 2025-05-28

## 2025-05-28 ENCOUNTER — OFFICE VISIT (OUTPATIENT)
Dept: ORTHOPEDIC SURGERY | Facility: CLINIC | Age: 60
End: 2025-05-28
Payer: COMMERCIAL

## 2025-05-28 ENCOUNTER — HOSPITAL ENCOUNTER (OUTPATIENT)
Dept: RADIOLOGY | Facility: HOSPITAL | Age: 60
Discharge: HOME | End: 2025-05-28
Payer: COMMERCIAL

## 2025-05-28 DIAGNOSIS — M79.645 PAIN OF LEFT THUMB: ICD-10-CM

## 2025-05-28 DIAGNOSIS — M19.049 OSTEOARTHRITIS OF METACARPOPHALANGEAL (MCP) JOINT: ICD-10-CM

## 2025-05-28 DIAGNOSIS — M79.645 PAIN OF LEFT THUMB: Primary | ICD-10-CM

## 2025-05-28 PROCEDURE — 73140 X-RAY EXAM OF FINGER(S): CPT | Mod: LT

## 2025-05-28 PROCEDURE — 99203 OFFICE O/P NEW LOW 30 MIN: CPT

## 2025-05-28 PROCEDURE — 73140 X-RAY EXAM OF FINGER(S): CPT | Mod: LEFT SIDE | Performed by: RADIOLOGY

## 2025-05-28 PROCEDURE — 1036F TOBACCO NON-USER: CPT

## 2025-05-28 ASSESSMENT — PAIN SCALES - GENERAL: PAINLEVEL_OUTOF10: 9

## 2025-05-28 ASSESSMENT — PAIN - FUNCTIONAL ASSESSMENT: PAIN_FUNCTIONAL_ASSESSMENT: 0-10

## 2025-05-28 NOTE — TELEPHONE ENCOUNTER
Patient saw  on 5/19 for pain in his thumb, advised it was arthritis.  Patient says the pain is much worse now, it is traveling up his arm. Any recommendations?

## 2025-05-28 NOTE — PROGRESS NOTES
HPI  Rj Adrian is a 60 y.o. male  in office today for   Chief Complaint   Patient presents with    Left Thumb - Pain     Pt states he feels like thumb is dislocated. Within the last couple months whole arm running from thumb to shoulder, neck and anthony area is very painful. Pain has gotten constant. This happened about a year ago. No injury had occurred prior to pain.    .  he does have a history of ankylosing spondylitis, does not have a rheumatologist or spine provider that he sees for that.  States he is taking embrel for his arthritis, also has Celebrex which are not helping.  He right now is more concerned with the radiating nerve pain than the pain at base of his thumb    Past Medical History: UC    Medication  Medications Ordered Prior to Encounter[1]    Physical Exam  Constitutional: well developed, well nourished male in no acute distress  Psychiatric: normal mood, appropriate affect  Eyes: sclera anicteric  HENT: normocephalic/atraumatic  CV: regular rate and rhythm   Respiratory: non labored breathing  Integumentary: no rash  Neurological: moves all extremities    Left Hand Exam     Tenderness   The patient is experiencing tenderness in the palmar area and radial area (base of thumb).     Range of Motion   The patient has normal left wrist ROM.    Muscle Strength   The patient has normal left wrist strength.    Tests   Phalen’s sign: positive  Tinel's sign (median nerve): negative    Other   Erythema: absent  Scars: absent  Sensation: decreased (all fingers)    Comments:  +CMC grind              Imaging/Lab:  X-rays were taken today which were reviewed by myself and read by myself and show no acute fracture or dislocation.  Moderate degenerative changes at the thumb CMC joint      Assessment  Assessment: left thumb CMC arthtitis    Plan  Plan:  History, physical exam, and imaging were reviewed with patient. Referral given for rheumatology for the ankylosing spondylitis and neuropathy.  Discussed options  for CMC arthritis including antiinflammatories, RICE, bracing, injections, and at end stage arthroplasty.  He is more concerned about the neuropathy for now, and will follow up with rheumatology for now.  Follow Up: Patient to follow up as needed if pain persists or gets worse.      All questions were answered for the patient prior to end of exam and patient addressed their understanding.    Evelin Tran PA-C  05/28/25         [1]   Current Outpatient Medications on File Prior to Visit   Medication Sig Dispense Refill    celecoxib (CeleBREX) 200 mg capsule Take 1 capsule (200 mg) by mouth once daily as needed for mild pain (1 - 3). 90 capsule 3    EnbreL SureClick 50 mg/mL (1 mL) pen injector pen injection INJECT 50 MG (1 ML) UNDER THE SKIN ONCE A WEEK 4 mL 11    lidocaine (Lidoderm) 5 % patch Place 1 patch over 12 hours on the skin once daily. Remove & discard patch within 12 hours or as directed by MD. 5 patch 0    multivitamin tablet Take by mouth.      traZODone (Desyrel) 50 mg tablet Take 1-2 tablets ( mg) by mouth as needed at bedtime for sleep. 180 tablet 3     No current facility-administered medications on file prior to visit.

## 2025-05-29 DIAGNOSIS — M19.90 ARTHRITIS: Primary | ICD-10-CM

## 2025-05-29 RX ORDER — METHYLPREDNISOLONE 4 MG/1
TABLET ORAL
Qty: 21 TABLET | Refills: 0 | Status: SHIPPED | OUTPATIENT
Start: 2025-05-29 | End: 2025-06-04

## 2025-06-03 ENCOUNTER — APPOINTMENT (OUTPATIENT)
Dept: ORTHOPEDIC SURGERY | Facility: CLINIC | Age: 60
End: 2025-06-03
Payer: COMMERCIAL

## 2025-06-13 ENCOUNTER — APPOINTMENT (OUTPATIENT)
Dept: CARDIOLOGY | Facility: HOSPITAL | Age: 60
End: 2025-06-13
Payer: COMMERCIAL

## 2025-06-13 ENCOUNTER — PREP FOR PROCEDURE (OUTPATIENT)
Dept: UROLOGY | Facility: HOSPITAL | Age: 60
End: 2025-06-13
Payer: COMMERCIAL

## 2025-06-13 ENCOUNTER — APPOINTMENT (OUTPATIENT)
Dept: RADIOLOGY | Facility: HOSPITAL | Age: 60
End: 2025-06-13
Payer: COMMERCIAL

## 2025-06-13 ENCOUNTER — HOSPITAL ENCOUNTER (OUTPATIENT)
Facility: HOSPITAL | Age: 60
Setting detail: OBSERVATION
Discharge: HOME | End: 2025-06-14
Attending: STUDENT IN AN ORGANIZED HEALTH CARE EDUCATION/TRAINING PROGRAM | Admitting: INTERNAL MEDICINE
Payer: COMMERCIAL

## 2025-06-13 DIAGNOSIS — R10.84 ABDOMINAL PAIN, GENERALIZED: Primary | ICD-10-CM

## 2025-06-13 DIAGNOSIS — R10.9 FLANK PAIN: ICD-10-CM

## 2025-06-13 DIAGNOSIS — N20.1 LEFT URETERAL STONE: Primary | ICD-10-CM

## 2025-06-13 DIAGNOSIS — N20.0 KIDNEY STONE: ICD-10-CM

## 2025-06-13 DIAGNOSIS — N20.1 LEFT URETERAL STONE: ICD-10-CM

## 2025-06-13 LAB
ALBUMIN SERPL BCP-MCNC: 3.8 G/DL (ref 3.4–5)
ALP SERPL-CCNC: 62 U/L (ref 33–120)
ALT SERPL W P-5'-P-CCNC: 14 U/L (ref 10–52)
ANION GAP SERPL CALC-SCNC: 12 MMOL/L (ref 10–20)
APPEARANCE UR: CLEAR
AST SERPL W P-5'-P-CCNC: 23 U/L (ref 9–39)
BASOPHILS # BLD AUTO: 0.04 X10*3/UL (ref 0–0.1)
BASOPHILS NFR BLD AUTO: 0.4 %
BILIRUB SERPL-MCNC: 0.3 MG/DL (ref 0–1.2)
BILIRUB UR STRIP.AUTO-MCNC: NEGATIVE MG/DL
BUN SERPL-MCNC: 13 MG/DL (ref 6–23)
CALCIUM SERPL-MCNC: 8.7 MG/DL (ref 8.6–10.3)
CARDIAC TROPONIN I PNL SERPL HS: 5 NG/L (ref 0–20)
CARDIAC TROPONIN I PNL SERPL HS: 6 NG/L (ref 0–20)
CHLORIDE SERPL-SCNC: 104 MMOL/L (ref 98–107)
CO2 SERPL-SCNC: 26 MMOL/L (ref 21–32)
COLOR UR: ABNORMAL
CREAT SERPL-MCNC: 1.44 MG/DL (ref 0.5–1.3)
EGFRCR SERPLBLD CKD-EPI 2021: 56 ML/MIN/1.73M*2
EOSINOPHIL # BLD AUTO: 0.18 X10*3/UL (ref 0–0.7)
EOSINOPHIL NFR BLD AUTO: 1.6 %
ERYTHROCYTE [DISTWIDTH] IN BLOOD BY AUTOMATED COUNT: 14.3 % (ref 11.5–14.5)
GLUCOSE SERPL-MCNC: 95 MG/DL (ref 74–99)
GLUCOSE UR STRIP.AUTO-MCNC: NORMAL MG/DL
HCT VFR BLD AUTO: 36.9 % (ref 41–52)
HGB BLD-MCNC: 11.8 G/DL (ref 13.5–17.5)
IMM GRANULOCYTES # BLD AUTO: 0.03 X10*3/UL (ref 0–0.7)
IMM GRANULOCYTES NFR BLD AUTO: 0.3 % (ref 0–0.9)
KETONES UR STRIP.AUTO-MCNC: ABNORMAL MG/DL
LACTATE SERPL-SCNC: 0.9 MMOL/L (ref 0.4–2)
LEUKOCYTE ESTERASE UR QL STRIP.AUTO: NEGATIVE
LIPASE SERPL-CCNC: 27 U/L (ref 9–82)
LYMPHOCYTES # BLD AUTO: 1.02 X10*3/UL (ref 1.2–4.8)
LYMPHOCYTES NFR BLD AUTO: 9.3 %
MAGNESIUM SERPL-MCNC: 1.73 MG/DL (ref 1.6–2.4)
MCH RBC QN AUTO: 25.1 PG (ref 26–34)
MCHC RBC AUTO-ENTMCNC: 32 G/DL (ref 32–36)
MCV RBC AUTO: 79 FL (ref 80–100)
MONOCYTES # BLD AUTO: 1.32 X10*3/UL (ref 0.1–1)
MONOCYTES NFR BLD AUTO: 12 %
MUCOUS THREADS #/AREA URNS AUTO: NORMAL /LPF
NEUTROPHILS # BLD AUTO: 8.41 X10*3/UL (ref 1.2–7.7)
NEUTROPHILS NFR BLD AUTO: 76.4 %
NITRITE UR QL STRIP.AUTO: NEGATIVE
NRBC BLD-RTO: 0 /100 WBCS (ref 0–0)
PH UR STRIP.AUTO: 6 [PH]
PLATELET # BLD AUTO: 319 X10*3/UL (ref 150–450)
POTASSIUM SERPL-SCNC: 4.1 MMOL/L (ref 3.5–5.3)
PROT SERPL-MCNC: 6.9 G/DL (ref 6.4–8.2)
PROT UR STRIP.AUTO-MCNC: NEGATIVE MG/DL
RBC # BLD AUTO: 4.7 X10*6/UL (ref 4.5–5.9)
RBC # UR STRIP.AUTO: ABNORMAL MG/DL
RBC #/AREA URNS AUTO: NORMAL /HPF
SODIUM SERPL-SCNC: 138 MMOL/L (ref 136–145)
SP GR UR STRIP.AUTO: 1.04
UROBILINOGEN UR STRIP.AUTO-MCNC: NORMAL MG/DL
WBC # BLD AUTO: 11 X10*3/UL (ref 4.4–11.3)
WBC #/AREA URNS AUTO: NORMAL /HPF

## 2025-06-13 PROCEDURE — 36415 COLL VENOUS BLD VENIPUNCTURE: CPT

## 2025-06-13 PROCEDURE — 71045 X-RAY EXAM CHEST 1 VIEW: CPT

## 2025-06-13 PROCEDURE — 74177 CT ABD & PELVIS W/CONTRAST: CPT

## 2025-06-13 PROCEDURE — 74177 CT ABD & PELVIS W/CONTRAST: CPT | Performed by: RADIOLOGY

## 2025-06-13 PROCEDURE — 83735 ASSAY OF MAGNESIUM: CPT

## 2025-06-13 PROCEDURE — 84450 TRANSFERASE (AST) (SGOT): CPT

## 2025-06-13 PROCEDURE — 2550000001 HC RX 255 CONTRASTS

## 2025-06-13 PROCEDURE — 2500000001 HC RX 250 WO HCPCS SELF ADMINISTERED DRUGS (ALT 637 FOR MEDICARE OP): Performed by: INTERNAL MEDICINE

## 2025-06-13 PROCEDURE — 2500000004 HC RX 250 GENERAL PHARMACY W/ HCPCS (ALT 636 FOR OP/ED): Performed by: INTERNAL MEDICINE

## 2025-06-13 PROCEDURE — 83690 ASSAY OF LIPASE: CPT

## 2025-06-13 PROCEDURE — 84484 ASSAY OF TROPONIN QUANT: CPT

## 2025-06-13 PROCEDURE — 2500000004 HC RX 250 GENERAL PHARMACY W/ HCPCS (ALT 636 FOR OP/ED)

## 2025-06-13 PROCEDURE — 71045 X-RAY EXAM CHEST 1 VIEW: CPT | Performed by: STUDENT IN AN ORGANIZED HEALTH CARE EDUCATION/TRAINING PROGRAM

## 2025-06-13 PROCEDURE — 83605 ASSAY OF LACTIC ACID: CPT

## 2025-06-13 PROCEDURE — 96375 TX/PRO/DX INJ NEW DRUG ADDON: CPT

## 2025-06-13 PROCEDURE — G0378 HOSPITAL OBSERVATION PER HR: HCPCS

## 2025-06-13 PROCEDURE — 99285 EMERGENCY DEPT VISIT HI MDM: CPT | Mod: 25 | Performed by: STUDENT IN AN ORGANIZED HEALTH CARE EDUCATION/TRAINING PROGRAM

## 2025-06-13 PROCEDURE — 81001 URINALYSIS AUTO W/SCOPE: CPT

## 2025-06-13 PROCEDURE — 99223 1ST HOSP IP/OBS HIGH 75: CPT | Performed by: INTERNAL MEDICINE

## 2025-06-13 PROCEDURE — 85025 COMPLETE CBC W/AUTO DIFF WBC: CPT

## 2025-06-13 PROCEDURE — 93005 ELECTROCARDIOGRAM TRACING: CPT

## 2025-06-13 RX ORDER — ONDANSETRON 4 MG/1
4 TABLET, FILM COATED ORAL EVERY 8 HOURS PRN
Status: DISCONTINUED | OUTPATIENT
Start: 2025-06-13 | End: 2025-06-14 | Stop reason: HOSPADM

## 2025-06-13 RX ORDER — MORPHINE SULFATE 4 MG/ML
4 INJECTION INTRAVENOUS ONCE
Status: COMPLETED | OUTPATIENT
Start: 2025-06-13 | End: 2025-06-13

## 2025-06-13 RX ORDER — ONDANSETRON HYDROCHLORIDE 2 MG/ML
4 INJECTION, SOLUTION INTRAVENOUS EVERY 8 HOURS PRN
Status: DISCONTINUED | OUTPATIENT
Start: 2025-06-13 | End: 2025-06-14 | Stop reason: HOSPADM

## 2025-06-13 RX ORDER — GUAIFENESIN/DEXTROMETHORPHAN 100-10MG/5
5 SYRUP ORAL EVERY 4 HOURS PRN
Status: DISCONTINUED | OUTPATIENT
Start: 2025-06-13 | End: 2025-06-14 | Stop reason: HOSPADM

## 2025-06-13 RX ORDER — DOCUSATE SODIUM 100 MG/1
100 CAPSULE, LIQUID FILLED ORAL 2 TIMES DAILY
Status: DISCONTINUED | OUTPATIENT
Start: 2025-06-13 | End: 2025-06-14 | Stop reason: HOSPADM

## 2025-06-13 RX ORDER — ONDANSETRON HYDROCHLORIDE 2 MG/ML
4 INJECTION, SOLUTION INTRAVENOUS ONCE
Status: COMPLETED | OUTPATIENT
Start: 2025-06-13 | End: 2025-06-13

## 2025-06-13 RX ORDER — CEFTRIAXONE 1 G/50ML
1 INJECTION, SOLUTION INTRAVENOUS ONCE
Status: COMPLETED | OUTPATIENT
Start: 2025-06-13 | End: 2025-06-13

## 2025-06-13 RX ORDER — GUAIFENESIN 600 MG/1
600 TABLET, EXTENDED RELEASE ORAL EVERY 12 HOURS PRN
Status: DISCONTINUED | OUTPATIENT
Start: 2025-06-13 | End: 2025-06-14 | Stop reason: HOSPADM

## 2025-06-13 RX ORDER — ALUMINUM HYDROXIDE, MAGNESIUM HYDROXIDE, AND SIMETHICONE 1200; 120; 1200 MG/30ML; MG/30ML; MG/30ML
30 SUSPENSION ORAL EVERY 6 HOURS PRN
Status: DISCONTINUED | OUTPATIENT
Start: 2025-06-13 | End: 2025-06-14 | Stop reason: HOSPADM

## 2025-06-13 RX ORDER — ACETAMINOPHEN 325 MG/1
650 TABLET ORAL EVERY 4 HOURS PRN
Status: DISCONTINUED | OUTPATIENT
Start: 2025-06-13 | End: 2025-06-14 | Stop reason: HOSPADM

## 2025-06-13 RX ORDER — TRAZODONE HYDROCHLORIDE 50 MG/1
50 TABLET ORAL NIGHTLY PRN
Status: DISCONTINUED | OUTPATIENT
Start: 2025-06-13 | End: 2025-06-14 | Stop reason: HOSPADM

## 2025-06-13 RX ORDER — ACETAMINOPHEN 650 MG/1
650 SUPPOSITORY RECTAL EVERY 4 HOURS PRN
Status: DISCONTINUED | OUTPATIENT
Start: 2025-06-13 | End: 2025-06-14 | Stop reason: HOSPADM

## 2025-06-13 RX ORDER — ACETAMINOPHEN 160 MG/5ML
650 SOLUTION ORAL EVERY 4 HOURS PRN
Status: DISCONTINUED | OUTPATIENT
Start: 2025-06-13 | End: 2025-06-14 | Stop reason: HOSPADM

## 2025-06-13 RX ORDER — MORPHINE SULFATE 2 MG/ML
2 INJECTION, SOLUTION INTRAMUSCULAR; INTRAVENOUS
Status: DISCONTINUED | OUTPATIENT
Start: 2025-06-13 | End: 2025-06-14 | Stop reason: HOSPADM

## 2025-06-13 RX ORDER — KETOROLAC TROMETHAMINE 30 MG/ML
30 INJECTION, SOLUTION INTRAMUSCULAR; INTRAVENOUS ONCE
Status: COMPLETED | OUTPATIENT
Start: 2025-06-13 | End: 2025-06-13

## 2025-06-13 RX ORDER — CALCIUM CARBONATE 200(500)MG
500 TABLET,CHEWABLE ORAL 4 TIMES DAILY PRN
Status: DISCONTINUED | OUTPATIENT
Start: 2025-06-13 | End: 2025-06-14 | Stop reason: HOSPADM

## 2025-06-13 RX ADMIN — MORPHINE SULFATE 2 MG: 2 INJECTION, SOLUTION INTRAMUSCULAR; INTRAVENOUS at 23:13

## 2025-06-13 RX ADMIN — KETOROLAC TROMETHAMINE 30 MG: 30 INJECTION, SOLUTION INTRAMUSCULAR at 19:47

## 2025-06-13 RX ADMIN — TRAZODONE HYDROCHLORIDE 50 MG: 50 TABLET ORAL at 23:14

## 2025-06-13 RX ADMIN — CEFTRIAXONE 1 G: 1 INJECTION, SOLUTION INTRAVENOUS at 22:59

## 2025-06-13 RX ADMIN — MORPHINE SULFATE 4 MG: 4 INJECTION INTRAVENOUS at 21:08

## 2025-06-13 RX ADMIN — ONDANSETRON 4 MG: 2 INJECTION, SOLUTION INTRAMUSCULAR; INTRAVENOUS at 19:47

## 2025-06-13 RX ADMIN — SODIUM CHLORIDE, SODIUM LACTATE, POTASSIUM CHLORIDE, AND CALCIUM CHLORIDE 1000 ML: .6; .31; .03; .02 INJECTION, SOLUTION INTRAVENOUS at 19:47

## 2025-06-13 RX ADMIN — IOHEXOL 75 ML: 350 INJECTION, SOLUTION INTRAVENOUS at 20:35

## 2025-06-13 SDOH — SOCIAL STABILITY: SOCIAL INSECURITY: WERE YOU ABLE TO COMPLETE ALL THE BEHAVIORAL HEALTH SCREENINGS?: YES

## 2025-06-13 SDOH — SOCIAL STABILITY: SOCIAL INSECURITY: HAVE YOU HAD THOUGHTS OF HARMING ANYONE ELSE?: NO

## 2025-06-13 SDOH — SOCIAL STABILITY: SOCIAL INSECURITY: ABUSE: ADULT

## 2025-06-13 SDOH — SOCIAL STABILITY: SOCIAL INSECURITY
WITHIN THE LAST YEAR, HAVE YOU BEEN KICKED, HIT, SLAPPED, OR OTHERWISE PHYSICALLY HURT BY YOUR PARTNER OR EX-PARTNER?: NO

## 2025-06-13 SDOH — SOCIAL STABILITY: SOCIAL INSECURITY: ARE THERE ANY APPARENT SIGNS OF INJURIES/BEHAVIORS THAT COULD BE RELATED TO ABUSE/NEGLECT?: NO

## 2025-06-13 SDOH — SOCIAL STABILITY: SOCIAL INSECURITY: ARE YOU OR HAVE YOU BEEN THREATENED OR ABUSED PHYSICALLY, EMOTIONALLY, OR SEXUALLY BY ANYONE?: NO

## 2025-06-13 SDOH — ECONOMIC STABILITY: INCOME INSECURITY: IN THE PAST 12 MONTHS HAS THE ELECTRIC, GAS, OIL, OR WATER COMPANY THREATENED TO SHUT OFF SERVICES IN YOUR HOME?: NO

## 2025-06-13 SDOH — ECONOMIC STABILITY: FOOD INSECURITY: WITHIN THE PAST 12 MONTHS, THE FOOD YOU BOUGHT JUST DIDN'T LAST AND YOU DIDN'T HAVE MONEY TO GET MORE.: NEVER TRUE

## 2025-06-13 SDOH — SOCIAL STABILITY: SOCIAL INSECURITY: HAVE YOU HAD ANY THOUGHTS OF HARMING ANYONE ELSE?: NO

## 2025-06-13 SDOH — SOCIAL STABILITY: SOCIAL INSECURITY
WITHIN THE LAST YEAR, HAVE YOU BEEN RAPED OR FORCED TO HAVE ANY KIND OF SEXUAL ACTIVITY BY YOUR PARTNER OR EX-PARTNER?: NO

## 2025-06-13 SDOH — SOCIAL STABILITY: SOCIAL INSECURITY: WITHIN THE LAST YEAR, HAVE YOU BEEN AFRAID OF YOUR PARTNER OR EX-PARTNER?: NO

## 2025-06-13 SDOH — ECONOMIC STABILITY: FOOD INSECURITY: WITHIN THE PAST 12 MONTHS, YOU WORRIED THAT YOUR FOOD WOULD RUN OUT BEFORE YOU GOT THE MONEY TO BUY MORE.: NEVER TRUE

## 2025-06-13 SDOH — SOCIAL STABILITY: SOCIAL INSECURITY: WITHIN THE LAST YEAR, HAVE YOU BEEN HUMILIATED OR EMOTIONALLY ABUSED IN OTHER WAYS BY YOUR PARTNER OR EX-PARTNER?: NO

## 2025-06-13 SDOH — SOCIAL STABILITY: SOCIAL INSECURITY: HAS ANYONE EVER THREATENED TO HURT YOUR FAMILY OR YOUR PETS?: NO

## 2025-06-13 SDOH — SOCIAL STABILITY: SOCIAL INSECURITY: DOES ANYONE TRY TO KEEP YOU FROM HAVING/CONTACTING OTHER FRIENDS OR DOING THINGS OUTSIDE YOUR HOME?: NO

## 2025-06-13 SDOH — SOCIAL STABILITY: SOCIAL INSECURITY: DO YOU FEEL UNSAFE GOING BACK TO THE PLACE WHERE YOU ARE LIVING?: NO

## 2025-06-13 SDOH — SOCIAL STABILITY: SOCIAL INSECURITY: DO YOU FEEL ANYONE HAS EXPLOITED OR TAKEN ADVANTAGE OF YOU FINANCIALLY OR OF YOUR PERSONAL PROPERTY?: NO

## 2025-06-13 ASSESSMENT — COGNITIVE AND FUNCTIONAL STATUS - GENERAL
DAILY ACTIVITIY SCORE: 24
MOBILITY SCORE: 24
PATIENT BASELINE BEDBOUND: NO

## 2025-06-13 ASSESSMENT — LIFESTYLE VARIABLES
HOW MANY STANDARD DRINKS CONTAINING ALCOHOL DO YOU HAVE ON A TYPICAL DAY: 1 OR 2
HOW OFTEN DO YOU HAVE 6 OR MORE DRINKS ON ONE OCCASION: NEVER
TOTAL SCORE: 0
AUDIT-C TOTAL SCORE: 2
AUDIT-C TOTAL SCORE: 2
SKIP TO QUESTIONS 9-10: 1
EVER FELT BAD OR GUILTY ABOUT YOUR DRINKING: NO
HAVE YOU EVER FELT YOU SHOULD CUT DOWN ON YOUR DRINKING: NO
HAVE PEOPLE ANNOYED YOU BY CRITICIZING YOUR DRINKING: NO
HOW OFTEN DO YOU HAVE A DRINK CONTAINING ALCOHOL: 2-4 TIMES A MONTH
EVER HAD A DRINK FIRST THING IN THE MORNING TO STEADY YOUR NERVES TO GET RID OF A HANGOVER: NO

## 2025-06-13 ASSESSMENT — PATIENT HEALTH QUESTIONNAIRE - PHQ9
SUM OF ALL RESPONSES TO PHQ9 QUESTIONS 1 & 2: 0
2. FEELING DOWN, DEPRESSED OR HOPELESS: NOT AT ALL
1. LITTLE INTEREST OR PLEASURE IN DOING THINGS: NOT AT ALL

## 2025-06-13 ASSESSMENT — PAIN SCALES - GENERAL
PAINLEVEL_OUTOF10: 0 - NO PAIN
PAINLEVEL_OUTOF10: 6
PAINLEVEL_OUTOF10: 9
PAINLEVEL_OUTOF10: 6

## 2025-06-13 ASSESSMENT — ACTIVITIES OF DAILY LIVING (ADL)
HEARING - RIGHT EAR: FUNCTIONAL
TOILETING: INDEPENDENT
BATHING: INDEPENDENT
DRESSING YOURSELF: INDEPENDENT
GROOMING: INDEPENDENT
LACK_OF_TRANSPORTATION: NO
FEEDING YOURSELF: INDEPENDENT
HEARING - LEFT EAR: FUNCTIONAL
WALKS IN HOME: INDEPENDENT
JUDGMENT_ADEQUATE_SAFELY_COMPLETE_DAILY_ACTIVITIES: YES
ADEQUATE_TO_COMPLETE_ADL: YES
PATIENT'S MEMORY ADEQUATE TO SAFELY COMPLETE DAILY ACTIVITIES?: YES

## 2025-06-13 ASSESSMENT — PAIN - FUNCTIONAL ASSESSMENT
PAIN_FUNCTIONAL_ASSESSMENT: 0-10

## 2025-06-13 ASSESSMENT — PAIN DESCRIPTION - PAIN TYPE: TYPE: ACUTE PAIN

## 2025-06-13 ASSESSMENT — PAIN DESCRIPTION - LOCATION: LOCATION: ABDOMEN

## 2025-06-13 NOTE — ED PROVIDER NOTES
HPI   Chief Complaint   Patient presents with   • Abdominal Pain   • Flank Pain       Patient is a 60-year-old male with significant history of ulcerative colitis presents to the ED for left flank pain and generalized abdominal pain times today.  Patient states pain is a stabbing sensation that is constant denies any aggravating factors.  Patient states the flank pain feels similar to kidney stone however he never has abdominal pain with his kidney stones.  Patient states he has also had bowel obstructions in the past and has needed ostomy bags in the past.  Patient has a pouch now.  Patient states he is unsure if this feels similar to that because he never has flank pain with that.  Patient denies any injury or falls.  Patient denies any other abdominal surgeries.  Patient states he has had 4 episodes of emesis today has been unable to tolerate p.o. intake.  Patient is still feeling nauseous.  Patient denies any fever chills congestion cough chest pain shortness of breath.  Patient states he cannot pass gas.  Denies any dysuria or hematuria.  Denies any tobacco alcohol or street drug abuse.  States he did have a couple beers today while golfing.              Patient History   Medical History[1]  Surgical History[2]  Family History[3]  Social History[4]    Physical Exam   ED Triage Vitals [06/13/25 1938]   Temperature Heart Rate Respirations BP   36.5 °C (97.7 °F) 73 16 (!) 170/96      Pulse Ox Temp Source Heart Rate Source Patient Position   100 % Temporal -- --      BP Location FiO2 (%)     -- --       Physical Exam  Cardiovascular:      Rate and Rhythm: Normal rate and regular rhythm.      Heart sounds: Normal heart sounds.   Pulmonary:      Effort: Pulmonary effort is normal.      Breath sounds: No wheezing, rhonchi or rales.   Abdominal:      Palpations: Abdomen is soft.      Tenderness: There is generalized abdominal tenderness. There is no guarding or rebound.   Neurological:      Mental Status: He is alert.            ED Course & MDM   ED Course as of 06/13/25 2300   Fri Jun 13, 2025   2259 Was sent up before urine was obtained had nurse call again and let floor know patient needs to give urine sample/straight cath.  []      ED Course User Index  [] Yudith Vigil PA-C         Diagnoses as of 06/13/25 2300   Abdominal pain, generalized   Flank pain   Kidney stone                 No data recorded     Todd Coma Scale Score: 15 (06/13/25 1942 : Checo Adam RN)                           Medical Decision Making  Medical Decision Making:  Patient presented as described in HPI. Patient case including ROS, PE, and treatment and plan discussed with ED attending if attached as cosigner. Due to patients presentation orders completed include as documented.  Patient is presenting for abdominal pain and flank pain.  Patient has history of bowel obstructions and kidney stones.  Patient has had 4 episodes of emesis.  Pending labs and imaging.  Labs are unremarkable.  Pending imaging.  Patient was given fluids Toradol and Zofran.  Patient is wishing for more pain medication given with morphine.  Patient has an UMM GFR 56 creatinine 1.44 rest of labs unremarkable.  Pending UA.  Imaging shows obstructing 0.8 cm calculus at the left UPJ with mild left hydronephrosis.  Mild wall thickening of the distal ileum near the anastomosis concerning for enteritis.  No bowel obstruction.  Patient educated on these findings.  Patient states if they are not doing anything to the kidney stone he would rather go home with pain medication and follow-up.  I reached out to Dr. Villalta urology on-call who will take him to surgery tomorrow recommends n.p.o. after midnight.  Patient is agreeable to plan.  I spoke to the hospitalist who accepts the patient.  Patient dion stable pending admission.          Patient care discussed with: N/A  Social Determinants affecting care: N/A    Final diagnosis and disposition as below.  See  CI    Hospitalize. I discussed the differential; results and admit plan with the patient and/or family/friend/caregiver if present.  I emphasized the importance of hospitalization need for re-evaluation/continued monitoring/interventions.. They agreed that if they feel their condition is worsening or if they have any other concern they should alert staff immediately for further assistance. I gave the patient an opportunity to ask all questions they had and answered all of them accordingly. The patient and/or family/friend/caregiver expressed understanding verbally and that they would comply.         Disposition:  admit    Hospitalize. Discussed findings and treatment done here in ED with admitting physician. It would be a risk to discharge the patient in their condition due to possibility of deterioration in their condition and the need for urgent interventions.    This note has been transcribed using voice recognition and may contain grammatical errors, misplaced words, incorrect words, incorrect phrases or other errors.        Labs Reviewed   CBC WITH AUTO DIFFERENTIAL - Abnormal       Result Value    WBC 11.0      nRBC 0.0      RBC 4.70      Hemoglobin 11.8 (*)     Hematocrit 36.9 (*)     MCV 79 (*)     MCH 25.1 (*)     MCHC 32.0      RDW 14.3      Platelets 319      Neutrophils % 76.4      Immature Granulocytes %, Automated 0.3      Lymphocytes % 9.3      Monocytes % 12.0      Eosinophils % 1.6      Basophils % 0.4      Neutrophils Absolute 8.41 (*)     Immature Granulocytes Absolute, Automated 0.03      Lymphocytes Absolute 1.02 (*)     Monocytes Absolute 1.32 (*)     Eosinophils Absolute 0.18      Basophils Absolute 0.04     COMPREHENSIVE METABOLIC PANEL - Abnormal    Glucose 95      Sodium 138      Potassium 4.1      Chloride 104      Bicarbonate 26      Anion Gap 12      Urea Nitrogen 13      Creatinine 1.44 (*)     eGFR 56 (*)     Calcium 8.7      Albumin 3.8      Alkaline Phosphatase 62      Total Protein  6.9      AST 23      Bilirubin, Total 0.3      ALT 14     MAGNESIUM - Normal    Magnesium 1.73     LACTATE - Normal    Lactate 0.9      Narrative:     Venipuncture immediately after or during the administration of Metamizole may lead to falsely low results. Testing should be performed immediately prior to Metamizole dosing.   LIPASE - Normal    Lipase 27      Narrative:     Venipuncture immediately after or during the administration of Metamizole may lead to falsely low results. Testing should be performed immediately prior to Metamizole dosing.   SERIAL TROPONIN-INITIAL - Normal    Troponin I, High Sensitivity 6      Narrative:     Less than 99th percentile of normal range cutoff-  Female and children under 18 years old <14 ng/L; Male <21 ng/L: Negative  Repeat testing should be performed if clinically indicated.     Female and children under 18 years old 14-50 ng/L; Male 21-50 ng/L:  Consistent with possible cardiac damage and possible increased clinical   risk. Serial measurements may help to assess extent of myocardial damage.     >50 ng/L: Consistent with cardiac damage, increased clinical risk and  myocardial infarction. Serial measurements may help assess extent of   myocardial damage.      NOTE: Children less than 1 year old may have higher baseline troponin   levels and results should be interpreted in conjunction with the overall   clinical context.     NOTE: Troponin I testing is performed using a different   testing methodology at [facility] than at other   [facility]. Direct result comparisons should only   be made within the same method.   SERIAL TROPONIN, 1 HOUR - Normal    Troponin I, High Sensitivity 5      Narrative:     Less than 99th percentile of normal range cutoff-  Female and children under 18 years old <14 ng/L; Male <21 ng/L: Negative  Repeat testing should be performed if clinically indicated.     Female and children under 18 years old 14-50 ng/L; Male 21-50  ng/L:  Consistent with possible cardiac damage and possible increased clinical   risk. Serial measurements may help to assess extent of myocardial damage.     >50 ng/L: Consistent with cardiac damage, increased clinical risk and  myocardial infarction. Serial measurements may help assess extent of   myocardial damage.      NOTE: Children less than 1 year old may have higher baseline troponin   levels and results should be interpreted in conjunction with the overall   clinical context.     NOTE: Troponin I testing is performed using a different   testing methodology at [facility] than at other   [facility]. Direct result comparisons should only   be made within the same method.   TROPONIN SERIES- (INITIAL, 1 HR)    Narrative:     The following orders were created for panel order Troponin I Series, High Sensitivity (0, 1 HR).  Procedure                               Abnormality         Status                     ---------                               -----------         ------                     Troponin I, High Sensiti...[[id number]]  Normal              Final result               Troponin, High Sensitivi...[[id number]]  Normal              Final result                 Please view results for these tests on the individual orders.   URINALYSIS WITH REFLEX CULTURE AND MICROSCOPIC    Narrative:     The following orders were created for panel order Urinalysis with Reflex Culture and Microscopic.  Procedure                               Abnormality         Status                     ---------                               -----------         ------                     Urinalysis with Reflex C...[[id number]]                                                 Extra Urine Gray Tube[[id number]]                                                         Please view results for these tests on the individual orders.   URINALYSIS WITH REFLEX CULTURE AND MICROSCOPIC   EXTRA URINE GRAY TUBE      CT abdomen pelvis w IV  contrast   Final Result   1. Obstructing 0.8 cm calculus at the left ureteropelvic junction   with mild left hydronephrosis.   2. Postsurgical change related to total colectomy and ileorectal or   ileoanal anastomosis. There is mild wall thickening of the distal   ileum near the anastomosis raising concern for enteritis. This region   of bowel demonstrates prominent caliber without evidence of   transition point to suggest obstruction.   3. Enlarged lymph nodes at the posterior aspect of the terminal   ileum-rectal region measuring up to 1.8 cm, reactive or neoplastic.   4. Mild distal esophageal wall thickening. Please correlate for   esophagitis.   5. Prostatomegaly.   6. Findings of ankylosing spondylitis.   7. Nonobstructing right upper pole renal calculi measuring up to 3 mm.             Signed by: Spencer Brothers 6/13/2025 9:04 PM   Dictation workstation:   [id number]      XR chest 1 view   Final Result        Mild irregularly of the right 8th posterior rib may reflect an   age-indeterminate fracture or artifact; correlate with point   tenderness.        Mild right basilar airspace opacities may represent atelectasis   and/or developing infection in the appropriate clinical context.        MACRO   None        Signed by: Yanet Dela Cruz 6/13/2025 8:03 PM   Dictation workstation:   [id number]         Procedure  Procedures       Yudith Vigil PA-C  06/13/25 2214         [1]  Past Medical History:  Diagnosis Date   • Other iron deficiency anemias 10/15/2018    Other iron deficiency anemia   • Ulcerative colitis, unspecified, without complications (Multi) 10/14/2022    Ulcerative colitis   [2]  No past surgical history on file.  [3]  No family history on file.  [4]  Social History  Tobacco Use   • Smoking status: Never   • Smokeless tobacco: Never   Vaping Use   • Vaping status: Never Used   Substance Use Topics   • Alcohol use: Yes     Comment: occ   • Drug use: Never      Yudith Vigil  JOSE  06/13/25 3395

## 2025-06-13 NOTE — ED TRIAGE NOTES
Patient from home began to experiencing left flank, lower abdominal pain nausea and emesis x4 that occurred while golfing earlier today. Hx of UC, kidney stones, bowel obstruction

## 2025-06-13 NOTE — LETTER
June 14, 2025     Patient: Rj Adrian   YOB: 1965   Date of Visit: 6/13/2025       To Whom It May Concern:    Rj Adrian was admitted to the hospital on 6/13/2025 . Please excuse Rj for his absence from work until 6/17/2025.           Sincerely,         Peggy Daigle CNP        CC: No Recipients

## 2025-06-14 ENCOUNTER — APPOINTMENT (OUTPATIENT)
Dept: RADIOLOGY | Facility: HOSPITAL | Age: 60
End: 2025-06-14
Payer: COMMERCIAL

## 2025-06-14 ENCOUNTER — ANESTHESIA (OUTPATIENT)
Dept: OPERATING ROOM | Facility: HOSPITAL | Age: 60
End: 2025-06-14
Payer: COMMERCIAL

## 2025-06-14 ENCOUNTER — ANESTHESIA EVENT (OUTPATIENT)
Dept: OPERATING ROOM | Facility: HOSPITAL | Age: 60
End: 2025-06-14
Payer: COMMERCIAL

## 2025-06-14 VITALS
DIASTOLIC BLOOD PRESSURE: 95 MMHG | HEART RATE: 105 BPM | SYSTOLIC BLOOD PRESSURE: 150 MMHG | WEIGHT: 144.7 LBS | RESPIRATION RATE: 16 BRPM | BODY MASS INDEX: 22.71 KG/M2 | TEMPERATURE: 98.6 F | HEIGHT: 67 IN | OXYGEN SATURATION: 96 %

## 2025-06-14 PROBLEM — R10.9 ABDOMINAL PAIN, UNSPECIFIED ABDOMINAL LOCATION: Status: RESOLVED | Noted: 2025-06-13 | Resolved: 2025-06-14

## 2025-06-14 PROBLEM — N20.1 LEFT URETERAL STONE: Status: RESOLVED | Noted: 2025-06-13 | Resolved: 2025-06-14

## 2025-06-14 LAB
ANION GAP SERPL CALC-SCNC: 11 MMOL/L (ref 10–20)
BUN SERPL-MCNC: 14 MG/DL (ref 6–23)
CALCIUM SERPL-MCNC: 7.8 MG/DL (ref 8.6–10.3)
CHLORIDE SERPL-SCNC: 105 MMOL/L (ref 98–107)
CO2 SERPL-SCNC: 25 MMOL/L (ref 21–32)
CREAT SERPL-MCNC: 1.57 MG/DL (ref 0.5–1.3)
EGFRCR SERPLBLD CKD-EPI 2021: 51 ML/MIN/1.73M*2
ERYTHROCYTE [DISTWIDTH] IN BLOOD BY AUTOMATED COUNT: 14.3 % (ref 11.5–14.5)
GLUCOSE SERPL-MCNC: 109 MG/DL (ref 74–99)
HCT VFR BLD AUTO: 33.5 % (ref 41–52)
HGB BLD-MCNC: 10.5 G/DL (ref 13.5–17.5)
HOLD SPECIMEN: NORMAL
MAGNESIUM SERPL-MCNC: 1.64 MG/DL (ref 1.6–2.4)
MCH RBC QN AUTO: 25.1 PG (ref 26–34)
MCHC RBC AUTO-ENTMCNC: 31.3 G/DL (ref 32–36)
MCV RBC AUTO: 80 FL (ref 80–100)
NRBC BLD-RTO: 0 /100 WBCS (ref 0–0)
PLATELET # BLD AUTO: 253 X10*3/UL (ref 150–450)
POTASSIUM SERPL-SCNC: 3.4 MMOL/L (ref 3.5–5.3)
RBC # BLD AUTO: 4.18 X10*6/UL (ref 4.5–5.9)
SODIUM SERPL-SCNC: 138 MMOL/L (ref 136–145)
WBC # BLD AUTO: 7.5 X10*3/UL (ref 4.4–11.3)

## 2025-06-14 PROCEDURE — 85027 COMPLETE CBC AUTOMATED: CPT | Performed by: INTERNAL MEDICINE

## 2025-06-14 PROCEDURE — 83735 ASSAY OF MAGNESIUM: CPT | Performed by: STUDENT IN AN ORGANIZED HEALTH CARE EDUCATION/TRAINING PROGRAM

## 2025-06-14 PROCEDURE — 52332 CYSTOSCOPY AND TREATMENT: CPT | Performed by: STUDENT IN AN ORGANIZED HEALTH CARE EDUCATION/TRAINING PROGRAM

## 2025-06-14 PROCEDURE — 80048 BASIC METABOLIC PNL TOTAL CA: CPT | Performed by: INTERNAL MEDICINE

## 2025-06-14 PROCEDURE — 2500000002 HC RX 250 W HCPCS SELF ADMINISTERED DRUGS (ALT 637 FOR MEDICARE OP, ALT 636 FOR OP/ED): Performed by: INTERNAL MEDICINE

## 2025-06-14 PROCEDURE — 96365 THER/PROPH/DIAG IV INF INIT: CPT | Mod: 59

## 2025-06-14 PROCEDURE — 99239 HOSP IP/OBS DSCHRG MGMT >30: CPT | Performed by: NURSE PRACTITIONER

## 2025-06-14 PROCEDURE — 2500000004 HC RX 250 GENERAL PHARMACY W/ HCPCS (ALT 636 FOR OP/ED): Performed by: ANESTHESIOLOGY

## 2025-06-14 PROCEDURE — 3600000008 HC OR TIME - EACH INCREMENTAL 1 MINUTE - PROCEDURE LEVEL THREE: Performed by: STUDENT IN AN ORGANIZED HEALTH CARE EDUCATION/TRAINING PROGRAM

## 2025-06-14 PROCEDURE — 76000 FLUOROSCOPY <1 HR PHYS/QHP: CPT

## 2025-06-14 PROCEDURE — C1769 GUIDE WIRE: HCPCS | Performed by: STUDENT IN AN ORGANIZED HEALTH CARE EDUCATION/TRAINING PROGRAM

## 2025-06-14 PROCEDURE — 96366 THER/PROPH/DIAG IV INF ADDON: CPT

## 2025-06-14 PROCEDURE — 2720000007 HC OR 272 NO HCPCS: Performed by: STUDENT IN AN ORGANIZED HEALTH CARE EDUCATION/TRAINING PROGRAM

## 2025-06-14 PROCEDURE — 7100000001 HC RECOVERY ROOM TIME - INITIAL BASE CHARGE: Performed by: STUDENT IN AN ORGANIZED HEALTH CARE EDUCATION/TRAINING PROGRAM

## 2025-06-14 PROCEDURE — 36415 COLL VENOUS BLD VENIPUNCTURE: CPT | Performed by: INTERNAL MEDICINE

## 2025-06-14 PROCEDURE — 99222 1ST HOSP IP/OBS MODERATE 55: CPT | Performed by: STUDENT IN AN ORGANIZED HEALTH CARE EDUCATION/TRAINING PROGRAM

## 2025-06-14 PROCEDURE — C2617 STENT, NON-COR, TEM W/O DEL: HCPCS | Performed by: STUDENT IN AN ORGANIZED HEALTH CARE EDUCATION/TRAINING PROGRAM

## 2025-06-14 PROCEDURE — 3600000003 HC OR TIME - INITIAL BASE CHARGE - PROCEDURE LEVEL THREE: Performed by: STUDENT IN AN ORGANIZED HEALTH CARE EDUCATION/TRAINING PROGRAM

## 2025-06-14 PROCEDURE — 2500000004 HC RX 250 GENERAL PHARMACY W/ HCPCS (ALT 636 FOR OP/ED): Performed by: INTERNAL MEDICINE

## 2025-06-14 PROCEDURE — 96376 TX/PRO/DX INJ SAME DRUG ADON: CPT | Mod: 59

## 2025-06-14 PROCEDURE — G0378 HOSPITAL OBSERVATION PER HR: HCPCS

## 2025-06-14 PROCEDURE — 3700000002 HC GENERAL ANESTHESIA TIME - EACH INCREMENTAL 1 MINUTE: Performed by: STUDENT IN AN ORGANIZED HEALTH CARE EDUCATION/TRAINING PROGRAM

## 2025-06-14 PROCEDURE — 7100000002 HC RECOVERY ROOM TIME - EACH INCREMENTAL 1 MINUTE: Performed by: STUDENT IN AN ORGANIZED HEALTH CARE EDUCATION/TRAINING PROGRAM

## 2025-06-14 PROCEDURE — 2500000004 HC RX 250 GENERAL PHARMACY W/ HCPCS (ALT 636 FOR OP/ED): Performed by: NURSE PRACTITIONER

## 2025-06-14 PROCEDURE — 2780000003 HC OR 278 NO HCPCS: Performed by: STUDENT IN AN ORGANIZED HEALTH CARE EDUCATION/TRAINING PROGRAM

## 2025-06-14 PROCEDURE — 3700000001 HC GENERAL ANESTHESIA TIME - INITIAL BASE CHARGE: Performed by: STUDENT IN AN ORGANIZED HEALTH CARE EDUCATION/TRAINING PROGRAM

## 2025-06-14 DEVICE — IMAJIN™ DOUBLE LOOP URETERAL STENT SILICONE HYDRO-COATED OPEN/OPEN CH FR 06 LENGTH 26 CM WITH STEERABLE PUSHER
Type: IMPLANTABLE DEVICE | Site: URETER | Status: FUNCTIONAL
Brand: PORGES COLOPLAST

## 2025-06-14 RX ORDER — ONDANSETRON HYDROCHLORIDE 2 MG/ML
4 INJECTION, SOLUTION INTRAVENOUS ONCE AS NEEDED
Status: DISCONTINUED | OUTPATIENT
Start: 2025-06-14 | End: 2025-06-14 | Stop reason: HOSPADM

## 2025-06-14 RX ORDER — OXYBUTYNIN CHLORIDE 5 MG/1
5 TABLET ORAL 3 TIMES DAILY PRN
Qty: 90 TABLET | Refills: 0 | Status: SHIPPED | OUTPATIENT
Start: 2025-06-14 | End: 2025-07-14

## 2025-06-14 RX ORDER — DEXTROSE MONOHYDRATE AND SODIUM CHLORIDE 5; .45 G/100ML; G/100ML
75 INJECTION, SOLUTION INTRAVENOUS CONTINUOUS
Status: DISCONTINUED | OUTPATIENT
Start: 2025-06-14 | End: 2025-06-14 | Stop reason: HOSPADM

## 2025-06-14 RX ORDER — TAMSULOSIN HYDROCHLORIDE 0.4 MG/1
0.4 CAPSULE ORAL DAILY
Status: DISCONTINUED | OUTPATIENT
Start: 2025-06-14 | End: 2025-06-14 | Stop reason: HOSPADM

## 2025-06-14 RX ORDER — ONDANSETRON HYDROCHLORIDE 2 MG/ML
INJECTION, SOLUTION INTRAVENOUS AS NEEDED
Status: DISCONTINUED | OUTPATIENT
Start: 2025-06-14 | End: 2025-06-14

## 2025-06-14 RX ORDER — SODIUM CHLORIDE, SODIUM LACTATE, POTASSIUM CHLORIDE, CALCIUM CHLORIDE 600; 310; 30; 20 MG/100ML; MG/100ML; MG/100ML; MG/100ML
20 INJECTION, SOLUTION INTRAVENOUS CONTINUOUS
Status: DISCONTINUED | OUTPATIENT
Start: 2025-06-14 | End: 2025-06-14 | Stop reason: HOSPADM

## 2025-06-14 RX ORDER — MIDAZOLAM HYDROCHLORIDE 1 MG/ML
INJECTION, SOLUTION INTRAMUSCULAR; INTRAVENOUS AS NEEDED
Status: DISCONTINUED | OUTPATIENT
Start: 2025-06-14 | End: 2025-06-14

## 2025-06-14 RX ORDER — FENTANYL CITRATE 50 UG/ML
INJECTION, SOLUTION INTRAMUSCULAR; INTRAVENOUS AS NEEDED
Status: DISCONTINUED | OUTPATIENT
Start: 2025-06-14 | End: 2025-06-14

## 2025-06-14 RX ORDER — POTASSIUM CHLORIDE 14.9 MG/ML
20 INJECTION INTRAVENOUS
Status: COMPLETED | OUTPATIENT
Start: 2025-06-14 | End: 2025-06-14

## 2025-06-14 RX ORDER — PROPOFOL 10 MG/ML
INJECTION, EMULSION INTRAVENOUS AS NEEDED
Status: DISCONTINUED | OUTPATIENT
Start: 2025-06-14 | End: 2025-06-14

## 2025-06-14 RX ORDER — ALBUTEROL SULFATE 0.83 MG/ML
2.5 SOLUTION RESPIRATORY (INHALATION) ONCE AS NEEDED
Status: DISCONTINUED | OUTPATIENT
Start: 2025-06-14 | End: 2025-06-14 | Stop reason: HOSPADM

## 2025-06-14 RX ORDER — SODIUM CHLORIDE, SODIUM LACTATE, POTASSIUM CHLORIDE, CALCIUM CHLORIDE 600; 310; 30; 20 MG/100ML; MG/100ML; MG/100ML; MG/100ML
100 INJECTION, SOLUTION INTRAVENOUS CONTINUOUS
Status: DISCONTINUED | OUTPATIENT
Start: 2025-06-14 | End: 2025-06-14 | Stop reason: HOSPADM

## 2025-06-14 RX ORDER — VASOPRESSIN 20 [USP'U]/ML
INJECTION, SOLUTION INTRAVENOUS CONTINUOUS PRN
Status: DISCONTINUED | OUTPATIENT
Start: 2025-06-14 | End: 2025-06-14

## 2025-06-14 RX ORDER — OXYCODONE HYDROCHLORIDE 5 MG/1
5 TABLET ORAL EVERY 4 HOURS PRN
Qty: 15 TABLET | Refills: 0 | Status: SHIPPED | OUTPATIENT
Start: 2025-06-14 | End: 2025-06-20

## 2025-06-14 RX ORDER — CEFAZOLIN 1 G/1
INJECTION, POWDER, FOR SOLUTION INTRAVENOUS AS NEEDED
Status: DISCONTINUED | OUTPATIENT
Start: 2025-06-14 | End: 2025-06-14

## 2025-06-14 RX ORDER — OXYCODONE HYDROCHLORIDE 5 MG/1
5 TABLET ORAL EVERY 4 HOURS PRN
Status: DISCONTINUED | OUTPATIENT
Start: 2025-06-14 | End: 2025-06-14 | Stop reason: HOSPADM

## 2025-06-14 RX ORDER — DROPERIDOL 2.5 MG/ML
0.62 INJECTION, SOLUTION INTRAMUSCULAR; INTRAVENOUS ONCE AS NEEDED
Status: DISCONTINUED | OUTPATIENT
Start: 2025-06-14 | End: 2025-06-14 | Stop reason: HOSPADM

## 2025-06-14 RX ORDER — POTASSIUM CHLORIDE 20 MEQ/1
40 TABLET, EXTENDED RELEASE ORAL ONCE
Status: DISCONTINUED | OUTPATIENT
Start: 2025-06-14 | End: 2025-06-14 | Stop reason: HOSPADM

## 2025-06-14 RX ORDER — MEPERIDINE HYDROCHLORIDE 25 MG/ML
12.5 INJECTION INTRAMUSCULAR; INTRAVENOUS; SUBCUTANEOUS EVERY 10 MIN PRN
Status: DISCONTINUED | OUTPATIENT
Start: 2025-06-14 | End: 2025-06-14 | Stop reason: HOSPADM

## 2025-06-14 RX ORDER — PHENYLEPHRINE HCL IN 0.9% NACL 0.4MG/10ML
SYRINGE (ML) INTRAVENOUS AS NEEDED
Status: DISCONTINUED | OUTPATIENT
Start: 2025-06-14 | End: 2025-06-14

## 2025-06-14 RX ADMIN — POTASSIUM CHLORIDE 20 MEQ: 14.9 INJECTION, SOLUTION INTRAVENOUS at 09:12

## 2025-06-14 RX ADMIN — Medication 120 MCG: at 13:19

## 2025-06-14 RX ADMIN — SODIUM CHLORIDE, POTASSIUM CHLORIDE, SODIUM LACTATE AND CALCIUM CHLORIDE: 600; 310; 30; 20 INJECTION, SOLUTION INTRAVENOUS at 13:16

## 2025-06-14 RX ADMIN — DEXTROSE AND SODIUM CHLORIDE 75 ML/HR: 5; .45 INJECTION, SOLUTION INTRAVENOUS at 00:52

## 2025-06-14 RX ADMIN — MORPHINE SULFATE 2 MG: 2 INJECTION, SOLUTION INTRAMUSCULAR; INTRAVENOUS at 09:32

## 2025-06-14 RX ADMIN — PROPOFOL 200 MG: 10 INJECTION, EMULSION INTRAVENOUS at 13:09

## 2025-06-14 RX ADMIN — CEFAZOLIN 2 G: 1 INJECTION, POWDER, FOR SOLUTION INTRAMUSCULAR; INTRAVENOUS at 13:14

## 2025-06-14 RX ADMIN — VASOPRESSIN 2 MILLI-UNITS/MIN: 20 INJECTION INTRAVENOUS at 13:34

## 2025-06-14 RX ADMIN — FENTANYL CITRATE 100 MCG: 50 INJECTION, SOLUTION INTRAMUSCULAR; INTRAVENOUS at 13:09

## 2025-06-14 RX ADMIN — Medication 200 MCG: at 13:21

## 2025-06-14 RX ADMIN — TAMSULOSIN HYDROCHLORIDE 0.4 MG: 0.4 CAPSULE ORAL at 09:12

## 2025-06-14 RX ADMIN — MIDAZOLAM 2 MG: 1 INJECTION INTRAMUSCULAR; INTRAVENOUS at 13:09

## 2025-06-14 RX ADMIN — Medication 120 MCG: at 13:24

## 2025-06-14 RX ADMIN — POTASSIUM CHLORIDE 20 MEQ: 14.9 INJECTION, SOLUTION INTRAVENOUS at 11:24

## 2025-06-14 RX ADMIN — MORPHINE SULFATE 2 MG: 2 INJECTION, SOLUTION INTRAMUSCULAR; INTRAVENOUS at 03:26

## 2025-06-14 RX ADMIN — DEXAMETHASONE SODIUM PHOSPHATE 4 MG: 4 INJECTION INTRA-ARTICULAR; INTRALESIONAL; INTRAMUSCULAR; INTRAVENOUS; SOFT TISSUE at 13:14

## 2025-06-14 RX ADMIN — MORPHINE SULFATE 2 MG: 2 INJECTION, SOLUTION INTRAMUSCULAR; INTRAVENOUS at 06:32

## 2025-06-14 RX ADMIN — ONDANSETRON 4 MG: 2 INJECTION, SOLUTION INTRAMUSCULAR; INTRAVENOUS at 13:14

## 2025-06-14 SDOH — HEALTH STABILITY: MENTAL HEALTH: CURRENT SMOKER: 0

## 2025-06-14 ASSESSMENT — COGNITIVE AND FUNCTIONAL STATUS - GENERAL
MOBILITY SCORE: 24
DAILY ACTIVITIY SCORE: 24

## 2025-06-14 ASSESSMENT — ENCOUNTER SYMPTOMS
RESPIRATORY NEGATIVE: 1
EYES NEGATIVE: 1
BACK PAIN: 1
ACTIVITY CHANGE: 1
PSYCHIATRIC NEGATIVE: 1
ENDOCRINE NEGATIVE: 1
CARDIOVASCULAR NEGATIVE: 1
ALLERGIC/IMMUNOLOGIC NEGATIVE: 1
NEUROLOGICAL NEGATIVE: 1
HEMATOLOGIC/LYMPHATIC NEGATIVE: 1
VOMITING: 1
ABDOMINAL PAIN: 1

## 2025-06-14 ASSESSMENT — PAIN SCALES - GENERAL
PAINLEVEL_OUTOF10: 6
PAINLEVEL_OUTOF10: 5 - MODERATE PAIN
PAINLEVEL_OUTOF10: 0 - NO PAIN
PAINLEVEL_OUTOF10: 1
PAINLEVEL_OUTOF10: 6
PAINLEVEL_OUTOF10: 0 - NO PAIN
PAINLEVEL_OUTOF10: 2
PAINLEVEL_OUTOF10: 2
PAINLEVEL_OUTOF10: 6

## 2025-06-14 ASSESSMENT — PAIN - FUNCTIONAL ASSESSMENT
PAIN_FUNCTIONAL_ASSESSMENT: 0-10

## 2025-06-14 ASSESSMENT — PAIN DESCRIPTION - LOCATION: LOCATION: BACK

## 2025-06-14 ASSESSMENT — ACTIVITIES OF DAILY LIVING (ADL): LACK_OF_TRANSPORTATION: NO

## 2025-06-14 NOTE — H&P
History Of Present Illness  Rj Adrian is a 60 y.o. male with history of ankylosing spondylitis, ulcerative colitis s/p total colectomy, and nephrolithiasis presenting with left-sided low back pain.  He says he was out golfing today when he suddenly developed left lower back pain radiating anteriorly to his lower abdomen.  He also began vomiting and as time went on the pain became bearable.  He denied having any fever, chills or sweats.  He says the symptoms felt similar to his kidney stone symptoms in the past so he made his wife bring him to the ED. CT of the abdomen and pelvis in the ED revealed an obstructing 0.8 cm calculus at the left ureteropelvic junction with mild left hydronephrosis.     Past Medical History  Medical History[1]   Ankylosing spondylitis (HCC)      Arthritis      Ulcerative colitis (HCC)    Nephrolithiasis    Past Surgical History  Procedure Laterality Date    ARTHROSCOPY KNEE DIAGNOSTIC W/WO SYNOVIAL BX SPX         Arthroscopy, knee=-bilateral    OPEN REPAIR OF ROTATOR CUFF ACUTE   6/92     Rotator cuff repair    PAST SURGICAL HISTORY OF   02/13/2001     Total colectomy/ileoanal pouch/diverting ileostomy    PAST SURGICAL HISTORY OF   05/08/2001      Dilate anal stricture, closure loop ileostomy, lysis of adhesions        Social History  He reports that he has never smoked. He has never used smokeless tobacco. He reports current alcohol use. He reports that he does not use drugs.    Family History  Problem Relation Age of Onset    Cancer Mother           Bladder cancer    Hypertension Father           Alive age 64    Emphysema Father         Allergies  Epinephrine, Infliximab, and Diphenhydramine    Medications  Current Outpatient Medications   Medication Instructions    celecoxib (CELEBREX) 200 mg, oral, Daily PRN    EnbreL SureClick 50 mg/mL (1 mL) pen injector pen injection INJECT 50 MG (1 ML) UNDER THE SKIN ONCE A WEEK    lidocaine (Lidoderm) 5 % patch 1 patch, transdermal, Daily,  Remove & discard patch within 12 hours or as directed by MD.    multivitamin tablet Take by mouth.    traZODone (DESYREL)  mg, oral, Nightly PRN       Review of Systems   Constitutional:  Positive for activity change.   HENT: Negative.     Eyes: Negative.    Respiratory: Negative.     Cardiovascular: Negative.    Gastrointestinal:  Positive for abdominal pain and vomiting.   Endocrine: Negative.    Genitourinary:         See HPI   Musculoskeletal:  Positive for back pain.   Skin: Negative.    Allergic/Immunologic: Negative.    Neurological: Negative.    Hematological: Negative.    Psychiatric/Behavioral: Negative.          Physical Exam  Constitutional:       General: He is not in acute distress.     Appearance: Normal appearance. He is not ill-appearing, toxic-appearing or diaphoretic.   HENT:      Head: Normocephalic and atraumatic.      Right Ear: External ear normal.      Left Ear: External ear normal.      Nose: Nose normal.      Mouth/Throat:      Mouth: Mucous membranes are moist.      Pharynx: Oropharynx is clear. No oropharyngeal exudate or posterior oropharyngeal erythema.   Eyes:      General: No scleral icterus.        Right eye: No discharge.         Left eye: No discharge.      Conjunctiva/sclera: Conjunctivae normal.   Cardiovascular:      Rate and Rhythm: Normal rate and regular rhythm.      Heart sounds: No murmur heard.  Pulmonary:      Breath sounds: No wheezing, rhonchi or rales.   Abdominal:      Palpations: Abdomen is soft. There is no mass.      Tenderness: There is abdominal tenderness. There is left CVA tenderness. There is no right CVA tenderness, guarding or rebound.      Comments: LLQ tenderness   Musculoskeletal:      Cervical back: Neck supple.      Right lower leg: No edema.      Left lower leg: No edema.   Lymphadenopathy:      Cervical: No cervical adenopathy.   Skin:     General: Skin is warm and dry.      Capillary Refill: Capillary refill takes less than 2 seconds.  "  Neurological:      General: No focal deficit present.      Mental Status: He is alert and oriented to person, place, and time.   Psychiatric:         Mood and Affect: Mood normal.         Behavior: Behavior normal.          Last Recorded Vitals  Blood pressure (!) 170/96, pulse 73, temperature 36.5 °C (97.7 °F), temperature source Temporal, resp. rate 16, height 1.702 m (5' 7\"), weight 63.5 kg (140 lb), SpO2 100%.    Relevant Results    Latest Reference Range & Units 06/13/25 19:43 06/13/25 21:00 06/13/25 22:50   GLUCOSE 74 - 99 mg/dL 95     SODIUM 136 - 145 mmol/L 138     POTASSIUM 3.5 - 5.3 mmol/L 4.1     CHLORIDE 98 - 107 mmol/L 104     Bicarbonate 21 - 32 mmol/L 26     Anion Gap 10 - 20 mmol/L 12     Blood Urea Nitrogen 6 - 23 mg/dL 13     Creatinine 0.50 - 1.30 mg/dL 1.44 (H)     EGFR >60 mL/min/1.73m*2 56 (L)     Calcium 8.6 - 10.3 mg/dL 8.7     Albumin 3.4 - 5.0 g/dL 3.8     Alkaline Phosphatase 33 - 120 U/L 62     ALT 10 - 52 U/L 14     AST 9 - 39 U/L 23     Bilirubin Total 0.0 - 1.2 mg/dL 0.3     Total Protein 6.4 - 8.2 g/dL 6.9     MAGNESIUM 1.60 - 2.40 mg/dL 1.73     Lactate 0.4 - 2.0 mmol/L 0.9     LIPASE 9 - 82 U/L 27     Troponin I, High Sensitivity 0 - 20 ng/L 6 5    WBC 4.4 - 11.3 x10*3/uL 11.0     nRBC 0.0 - 0.0 /100 WBCs 0.0     RBC 4.50 - 5.90 x10*6/uL 4.70     HEMOGLOBIN 13.5 - 17.5 g/dL 11.8 (L)     HEMATOCRIT 41.0 - 52.0 % 36.9 (L)     MCV 80 - 100 fL 79 (L)     MCH 26.0 - 34.0 pg 25.1 (L)     MCHC 32.0 - 36.0 g/dL 32.0     RED CELL DISTRIBUTION WIDTH 11.5 - 14.5 % 14.3     Platelets 150 - 450 x10*3/uL 319     Neutrophils % 40.0 - 80.0 % 76.4     Immature Granulocytes %, Automated 0.0 - 0.9 % 0.3     Lymphocytes % 13.0 - 44.0 % 9.3     Monocytes % 2.0 - 10.0 % 12.0     Eosinophils % 0.0 - 6.0 % 1.6     Basophils % 0.0 - 2.0 % 0.4     Neutrophils Absolute 1.20 - 7.70 x10*3/uL 8.41 (H)     Immature Granulocytes Absolute, Automated 0.00 - 0.70 x10*3/uL 0.03     Lymphocytes Absolute 1.20 - 4.80 " x10*3/uL 1.02 (L)     Monocytes Absolute 0.10 - 1.00 x10*3/uL 1.32 (H)     Eosinophils Absolute 0.00 - 0.70 x10*3/uL 0.18     Basophils Absolute 0.00 - 0.10 x10*3/uL 0.04     Color, Urine Light-Yellow, Yellow, Dark-Yellow    Light-Yellow   Appearance, Urine Clear    Clear   Specific Gravity, Urine 1.005 - 1.035    1.040 !   pH, Urine 5.0, 5.5, 6.0, 6.5, 7.0, 7.5, 8.0    6.0   Protein, Urine NEGATIVE, 10 (TRACE), 20 (TRACE) mg/dL   NEGATIVE   Glucose, Urine Normal mg/dL   Normal   Blood, Urine NEGATIVE mg/dL   0.2 (2+) !   Ketones, Urine NEGATIVE mg/dL   TRACE !   Bilirubin, Urine NEGATIVE mg/dL   NEGATIVE   Urobilinogen, Urine Normal mg/dL   Normal   Nitrite, Urine NEGATIVE    NEGATIVE   Leukocyte Esterase, Urine NEGATIVE    NEGATIVE   WBC, Urine 1-5, NONE /HPF   NONE   RBC, Urine NONE, 1-2, 3-5 /HPF   3-5   Mucus, Urine Reference range not established. /LPF   FEW   (H): Data is abnormally high  (L): Data is abnormally low  !: Data is abnormal    XR CHEST  IMPRESSION:      Mild irregularly of the right 8th posterior rib may reflect an  age-indeterminate fracture or artifact; correlate with point  tenderness.      Mild right basilar airspace opacities may represent atelectasis  and/or developing infection in the appropriate clinical context.      MACRO  None      Signed by: Yanet Dela Cruz 6/13/2025 8:03 PM    CT ABD/PELVIS  IMPRESSION:  1. Obstructing 0.8 cm calculus at the left ureteropelvic junction  with mild left hydronephrosis.  2. Postsurgical change related to total colectomy and ileorectal or  ileoanal anastomosis. There is mild wall thickening of the distal  ileum near the anastomosis raising concern for enteritis. This region  of bowel demonstrates prominent caliber without evidence of  transition point to suggest obstruction.  3. Enlarged lymph nodes at the posterior aspect of the terminal  ileum-rectal region measuring up to 1.8 cm, reactive or neoplastic.  4. Mild distal esophageal wall thickening.  Please correlate for  esophagitis.  5. Prostatomegaly.  6. Findings of ankylosing spondylitis.  7. Nonobstructing right upper pole renal calculi measuring up to 3 mm.          Signed by: Spencer Brothers 6/13/2025 9:04 PM  Assessment & Plan    Left ureteral calculus with mild obstructive uropathy  - Calculus is 0.8 cm and thus unlikely to pass spontaneously  - Has evidence of UMM and mild hydronephrosis  - Admit to medical floor  - Pain control  - Urology to see  - IVF    UMM  - Related to #1  - Avoidance of nephrotoxic meds    History of ulcerative colitis  - S/P total colectomy  - Clinically stable    Ankylosing spondylitis  - On weekly enbrel injection    Elevated BP readings without diagnosis of hypertension  - Elevated BP readings most likely due to pain related to #1  - Pain control and monitor     I spent 75 minutes in the professional and overall care of this patient.      Bryant Magallon MD         [1]   Past Medical History:  Diagnosis Date    Other iron deficiency anemias 10/15/2018    Other iron deficiency anemia    Ulcerative colitis, unspecified, without complications (Multi) 10/14/2022    Ulcerative colitis

## 2025-06-14 NOTE — CONSULTS
Inpatient consult to Urology  Consult performed by: Eber Villalta MD  Consult ordered by: Bryant Magallon MD  Reason for consult: Left ureteral stone and acute kidney injury  Assessment/Recommendations: Keep n.p.o.  Pain control  IV hydration  IV antibiotics  Take 2 OR today for cystoscopy and left ureteral stent insertion          Reason For Consult  Left 8 mm ureteral stone  UMM    History Of Present Illness  Rj Adrian is a 60 y.o. male presenting with 3 of ankylosing spondylitis and ulcerative colitis s/p total colectomy, with left sided low back pain that started sharply yesterday, radiating to the lower abdomen.    This was associated with vomiting.  No fever or chills.    In ED, CT revealed an 8 mm stone in the left UPJ, along with left mild hydronephrosis.    Kidney function revealed rising creatinine suggestive of UMM, which worsened further today.     Past Medical History  He has a past medical history of Other iron deficiency anemias (10/15/2018) and Ulcerative colitis, unspecified, without complications (Multi) (10/14/2022).    Surgical History  He has no past surgical history on file.     Social History  He reports that he has never smoked. He has never used smokeless tobacco. He reports current alcohol use. He reports that he does not use drugs.    Family History  Family History[1]     Allergies  Epinephrine, Infliximab, and Diphenhydramine    Review of Systems   A complete review of systems was performed. All systems are noted to be negative unless indicated in the history of present illness, impression, active problem list, or past histories.    Physical Exam  Constitutional:       General: He is not in acute distress.     Appearance: Normal appearance. He is not toxic-appearing.   Cardiovascular:      Rate and Rhythm: Normal rate.   Pulmonary:      Effort: Pulmonary effort is normal.   Abdominal:      General: Abdomen is flat.      Palpations: Abdomen is soft.   Neurological:      Mental  "Status: He is alert.          Last Recorded Vitals  Blood pressure 144/83, pulse 97, temperature 36.3 °C (97.3 °F), temperature source Temporal, resp. rate 16, height 1.702 m (5' 7\"), weight 65.6 kg (144 lb 11.2 oz), SpO2 95%.    Relevant Results  Results for orders placed or performed during the hospital encounter of 06/13/25 (from the past 96 hours)   CBC and Auto Differential   Result Value Ref Range    WBC 11.0 4.4 - 11.3 x10*3/uL    nRBC 0.0 0.0 - 0.0 /100 WBCs    RBC 4.70 4.50 - 5.90 x10*6/uL    Hemoglobin 11.8 (L) 13.5 - 17.5 g/dL    Hematocrit 36.9 (L) 41.0 - 52.0 %    MCV 79 (L) 80 - 100 fL    MCH 25.1 (L) 26.0 - 34.0 pg    MCHC 32.0 32.0 - 36.0 g/dL    RDW 14.3 11.5 - 14.5 %    Platelets 319 150 - 450 x10*3/uL    Neutrophils % 76.4 40.0 - 80.0 %    Immature Granulocytes %, Automated 0.3 0.0 - 0.9 %    Lymphocytes % 9.3 13.0 - 44.0 %    Monocytes % 12.0 2.0 - 10.0 %    Eosinophils % 1.6 0.0 - 6.0 %    Basophils % 0.4 0.0 - 2.0 %    Neutrophils Absolute 8.41 (H) 1.20 - 7.70 x10*3/uL    Immature Granulocytes Absolute, Automated 0.03 0.00 - 0.70 x10*3/uL    Lymphocytes Absolute 1.02 (L) 1.20 - 4.80 x10*3/uL    Monocytes Absolute 1.32 (H) 0.10 - 1.00 x10*3/uL    Eosinophils Absolute 0.18 0.00 - 0.70 x10*3/uL    Basophils Absolute 0.04 0.00 - 0.10 x10*3/uL   Comprehensive metabolic panel   Result Value Ref Range    Glucose 95 74 - 99 mg/dL    Sodium 138 136 - 145 mmol/L    Potassium 4.1 3.5 - 5.3 mmol/L    Chloride 104 98 - 107 mmol/L    Bicarbonate 26 21 - 32 mmol/L    Anion Gap 12 10 - 20 mmol/L    Urea Nitrogen 13 6 - 23 mg/dL    Creatinine 1.44 (H) 0.50 - 1.30 mg/dL    eGFR 56 (L) >60 mL/min/1.73m*2    Calcium 8.7 8.6 - 10.3 mg/dL    Albumin 3.8 3.4 - 5.0 g/dL    Alkaline Phosphatase 62 33 - 120 U/L    Total Protein 6.9 6.4 - 8.2 g/dL    AST 23 9 - 39 U/L    Bilirubin, Total 0.3 0.0 - 1.2 mg/dL    ALT 14 10 - 52 U/L   Magnesium   Result Value Ref Range    Magnesium 1.73 1.60 - 2.40 mg/dL   Lactate   Result " Value Ref Range    Lactate 0.9 0.4 - 2.0 mmol/L   Lipase   Result Value Ref Range    Lipase 27 9 - 82 U/L   Troponin I, High Sensitivity, Initial   Result Value Ref Range    Troponin I, High Sensitivity 6 0 - 20 ng/L   Troponin, High Sensitivity, 1 Hour   Result Value Ref Range    Troponin I, High Sensitivity 5 0 - 20 ng/L   Urinalysis with Reflex Culture and Microscopic   Result Value Ref Range    Color, Urine Light-Yellow Light-Yellow, Yellow, Dark-Yellow    Appearance, Urine Clear Clear    Specific Gravity, Urine 1.040 (N) 1.005 - 1.035    pH, Urine 6.0 5.0, 5.5, 6.0, 6.5, 7.0, 7.5, 8.0    Protein, Urine NEGATIVE NEGATIVE, 10 (TRACE), 20 (TRACE) mg/dL    Glucose, Urine Normal Normal mg/dL    Blood, Urine 0.2 (2+) (A) NEGATIVE mg/dL    Ketones, Urine TRACE (A) NEGATIVE mg/dL    Bilirubin, Urine NEGATIVE NEGATIVE mg/dL    Urobilinogen, Urine Normal Normal mg/dL    Nitrite, Urine NEGATIVE NEGATIVE    Leukocyte Esterase, Urine NEGATIVE NEGATIVE   Extra Urine Gray Tube   Result Value Ref Range    Extra Tube Hold for add-ons.    Urinalysis Microscopic   Result Value Ref Range    WBC, Urine NONE 1-5, NONE /HPF    RBC, Urine 3-5 NONE, 1-2, 3-5 /HPF    Mucus, Urine FEW Reference range not established. /LPF   Basic metabolic panel   Result Value Ref Range    Glucose 109 (H) 74 - 99 mg/dL    Sodium 138 136 - 145 mmol/L    Potassium 3.4 (L) 3.5 - 5.3 mmol/L    Chloride 105 98 - 107 mmol/L    Bicarbonate 25 21 - 32 mmol/L    Anion Gap 11 10 - 20 mmol/L    Urea Nitrogen 14 6 - 23 mg/dL    Creatinine 1.57 (H) 0.50 - 1.30 mg/dL    eGFR 51 (L) >60 mL/min/1.73m*2    Calcium 7.8 (L) 8.6 - 10.3 mg/dL   CBC   Result Value Ref Range    WBC 7.5 4.4 - 11.3 x10*3/uL    nRBC 0.0 0.0 - 0.0 /100 WBCs    RBC 4.18 (L) 4.50 - 5.90 x10*6/uL    Hemoglobin 10.5 (L) 13.5 - 17.5 g/dL    Hematocrit 33.5 (L) 41.0 - 52.0 %    MCV 80 80 - 100 fL    MCH 25.1 (L) 26.0 - 34.0 pg    MCHC 31.3 (L) 32.0 - 36.0 g/dL    RDW 14.3 11.5 - 14.5 %    Platelets 253  150 - 450 x10*3/uL   Magnesium   Result Value Ref Range    Magnesium 1.64 1.60 - 2.40 mg/dL     CT abdomen pelvis w IV contrast  Result Date: 6/13/2025  Interpreted By:  Spencer Brothers, STUDY: CT ABDOMEN PELVIS W IV CONTRAST;  6/13/2025 8:34 pm   INDICATION: Signs/Symptoms:flank pain and generalized abd pina n/v history of bowel obstructions and kidney stones.   COMPARISON: None available   ACCESSION NUMBER(S): [id number]   ORDERING CLINICIAN: BING MCKEON   TECHNIQUE: Axial CT images of the abdomen and pelvis with coronal and sagittal reconstructed images obtained.  75 ML of Omnipaque 350 was administered intravenously without immediate complication.   FINDINGS: LOWER CHEST: There is mild dependent hypoventilatory change. Normal heart size. Trace coronary atherosclerotic calcification.   LIVER: Within normal limits.   BILE DUCTS: Normal caliber.   GALLBLADDER: No calcified stones. No wall thickening.   PANCREAS: Within normal limits.   SPLEEN: Within normal limits.   ADRENALS: Within normal limits.   KIDNEYS, URETERS, and BLADDER: No right hydronephrosis. Nonobstructing right upper pole renal calculi measuring up to 3 mm. There is delayed left renal enhancement, prominent left perinephric fat stranding, and mild left hydronephrosis secondary to obstructing 0.8 cm calculus at the left ureteropelvic junction. The left distal ureter is nondilated.   There is slight diffuse bladder wall thickening.   REPRODUCTIVE: Prostatomegaly measuring 5 cm transverse.   VESSELS: Mild atherosclerosis. No abdominal aortic aneurysm.   RETROPERITONEUM and LYMPH NODES: There are enlarged lymph nodes at the posterior aspect of the terminal ileum-rectal region, for example a conglomerate enlarged node measures 1.7 x 1.8 cm on image 115/161.   BOWEL: Mild distal esophageal wall thickening. Small hiatal hernia. Postsurgical change related to total colectomy and ileorectal or ileoanal anastomosis. There are anastomotic sutures  along the majority of the distal bowel proximal to the anus. There is mild wall thickening of the terminal ileum just proximal to the anastomosis; this region of bowel demonstrates prominent caliber without evidence of transition point to suggest obstruction. The remainder of the small bowel is unremarkable.   PERITONEUM: No ascites or free air. No fluid collection.   BODY WALL: Small fat containing inguinal hernias. Postsurgical changes of the ventral abdominal wall related to prior abdominal wall repair.   MUSCULOSKELETAL: There is ankylosis of the sacroiliac joints. 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 There is mild bilateral hip arthrosis. There are confluent syndesmophytes across the thoracic spine compatible with ankylosing spondylitis.       1. Obstructing 0.8 cm calculus at the left ureteropelvic junction with mild left hydronephrosis. 2. Postsurgical change related to total colectomy and ileorectal or ileoanal anastomosis. There is mild wall thickening of the distal ileum near the anastomosis raising concern for enteritis. This region of bowel demonstrates prominent caliber without evidence of transition point to suggest obstruction. 3. Enlarged lymph nodes at the posterior aspect of the terminal ileum-rectal region measuring up to 1.8 cm, reactive or neoplastic. 4. Mild distal esophageal wall thickening. Please correlate for esophagitis. 5. Prostatomegaly. 6. Findings of ankylosing spondylitis. 7. Nonobstructing right upper pole renal calculi measuring up to 3 mm.     Signed by: Spencer Brothers 6/13/2025 9:04 PM Dictation workstation:   [id number]    XR chest 1 view  Result Date: 6/13/2025  Interpreted By:  Yanet Dela Cruz, STUDY: XR CHEST 1 VIEW; 6/13/2025 7:58 pm   INDICATION: Signs/Symptoms:sob   COMPARISON: Radiographs 01/27/2005   ACCESSION NUMBER(S): [id number]   ORDERING CLINICIAN: BING MCKEON   TECHNIQUE: Single frontal view of the chest performed.    FINDINGS:   LINES AND DEVICES: None.   LUNGS: Mild right basilar airspace opacities. No pleural effusion, pulmonary edema or pneumothorax.   CARDIOMEDIASTINAL SILHOUETTE: The cardiomediastinal silhouette is within normal limits.   OTHER: Mild irregularity of the right 8th posterior rib is noted. Remote fracture deformity of right 7th posterior rib.         Mild irregularly of the right 8th posterior rib may reflect an age-indeterminate fracture or artifact; correlate with point tenderness.   Mild right basilar airspace opacities may represent atelectasis and/or developing infection in the appropriate clinical context.   MACRO None   Signed by: Yanet Dela Cruz 6/13/2025 8:03 PM Dictation workstation:   [id number]    XR thumb left MIN 2 views  Result Date: 5/29/2025  Interpreted By:  Marcos Wells, STUDY: XR THUMB LEFT MIN 2 VIEWS; ;  5/28/2025 3:05 pm   INDICATION: Signs/Symptoms:LT THUMB PAIN.   ,M79.645 Pain in left finger(s)   COMPARISON: None.   ACCESSION NUMBER(S): [id number]   ORDERING CLINICIAN: MOHSEN CHRISTIAN   FINDINGS: Bony structures are intact. Minimal degenerative change at the IP joint space of the thumb. Mild-to-moderate degenerative change at the 1st CMC joint space. Joint spaces are otherwise maintained.       Degenerative change worse at the 1st CMC joint space. No fracture or dislocation.     MACRO: None   Signed by: Marcos Wells 5/29/2025 9:19 AM Dictation workstation:   [id number]         Assessment/Plan   70-year-old male with left ureteral stone and UMM, today kidney function slightly worse.    I personally reviewed the medical records of the patient including the note of the referring physician including the CT images as well as report, focusing on the left ureteral stone and appearance of the left kidney.    I discussed with the patient his options including cystoscopy and ureteral stent, ESWL, ureteroscopy, and the need to stage those considering his UMM that has worsened since yesterday,  and the emergency aspect that prevents us from performing ESWL or using the laser.    The optimal course of action would be inserting a stent now, and deferring the stone management procedure for another 2 to 3 weeks, giving time for the kidney to recover and the ureter to dilate.    We will also examine if the stone is radiopaque today to assess the feasibility of ESWL.    Plan:  Keep n.p.o.  Pain control  IV hydration  IV antibiotics  Take 2 OR today for cystoscopy and left ureteral stent insertion                 [1] No family history on file.

## 2025-06-14 NOTE — ANESTHESIA POSTPROCEDURE EVALUATION
Patient: Rj Adrian    Procedure Summary       Date: 06/14/25 Room / Location: [facility] OR 03 / [facility]    Anesthesia Start: 1304 Anesthesia Stop: 1347    Procedure: CYSTOSCOPY, WITH URETERAL STENT INSERTION (Left) Diagnosis:       Left ureteral stone      (Left ureteral stone [N20.1])    Surgeons: Eber Villalta MD Responsible Provider: Juan Sherman MD    Anesthesia Type: general ASA Status: 2            Anesthesia Type: general    Vitals Value Taken Time   BP See vitals 06/14/25 13:47   Temp  06/14/25 13:47   Pulse  06/14/25 13:47   Resp  06/14/25 13:47   SpO2  06/14/25 13:47       Anesthesia Post Evaluation    Patient location during evaluation: PACU  Patient participation: complete - patient participated  Level of consciousness: awake  Pain management: adequate  Multimodal analgesia pain management approach  Airway patency: patent  Two or more strategies used to mitigate risk of obstructive sleep apnea  Cardiovascular status: acceptable  Respiratory status: acceptable  Hydration status: acceptable  Postoperative Nausea and Vomiting: none        No notable events documented.

## 2025-06-14 NOTE — OP NOTE
CYSTOSCOPY, WITH URETERAL STENT INSERTION (L) Operative Note     Date: 2025 - 2025  OR Location: [facility] OR    Name: Rj Adrian : 1965, Age: 60 y.o., MRN: [id number], Sex: male    Diagnosis  Pre-op Diagnosis      * Left ureteral stone [N20.1] Post-op Diagnosis     * Left ureteral stone [N20.1]     Procedures  CYSTOSCOPY, WITH URETERAL STENT INSERTION  31818 - ND CYSTO W/INSERT URETERAL STENT    Surgeons      * Eber Villalta - Primary    Resident/Fellow/Other Assistant:  Surgeons and Role:  * No surgeons found with a matching role *    Staff:   Circulator: Vinicio Purvis Person: Rosalio    Anesthesia Staff: Anesthesiologist (Solo in Case): Juan Sherman MD    Procedure Summary  Anesthesia: General  ASA: II  Estimated Blood Loss: 0 mL  Intra-op Medications:   Administrations occurring from 1130 to 1235 on 25:   Medication Name Total Dose   alum-mag hydroxide-simeth (Mylanta) 200-200-20 mg/5 mL oral suspension 30 mL Cannot be calculated   calcium carbonate (Tums) 500 mg (200 mg elemental) chewable tablet 1 tablet Cannot be calculated   dextromethorphan-guaifenesin (Robitussin DM)  mg/5 mL oral liquid 5 mL Cannot be calculated   docusate sodium (Colace) capsule 100 mg Cannot be calculated   guaiFENesin (Mucinex) 12 hr tablet 600 mg Cannot be calculated   morphine injection 2 mg Cannot be calculated   potassium chloride CR (Klor-Con M20) ER tablet 40 mEq Cannot be calculated   tamsulosin (Flomax) 24 hr capsule 0.4 mg Cannot be calculated   traZODone (Desyrel) tablet 50 mg Cannot be calculated              Anesthesia Record               Intraprocedure I/O Totals       None           Specimen: No specimens collected              Drains and/or Catheters: * None in log *    Tourniquet Times:         Implants:  Implants       Type Name Action Serial No.      Implant STENT KIT, IMAJIN HYDRO, 6FR X 26CM, POSITIONER, NO GUIDEWIRE - [id number] Implanted               Findings: stone visible on  fluoroscopy,  pushed into kidney    Indications: Rj Adrian is an 60 y.o. male who is having surgery for Left ureteral stone [N20.1].     The patient was seen in the preoperative area. The risks, benefits, complications, treatment options, non-operative alternatives, expected recovery and outcomes were discussed with the patient. The possibilities of reaction to medication, pulmonary aspiration, injury to surrounding structures, bleeding, recurrent infection, the need for additional procedures, failure to diagnose a condition, and creating a complication requiring transfusion or operation were discussed with the patient. The patient concurred with the proposed plan, giving informed consent.  The site of surgery was properly noted/marked if necessary per policy. The patient has been actively warmed in preoperative area. Preoperative antibiotics have been ordered and given within 1 hours of incision. Venous thrombosis prophylaxis have been ordered including bilateral sequential compression devices    Procedure Details: Patient was consented and antibiotics were started on call to OR.  In the operating room, under general anesthesia, with the patient in dorsolithotomy position, genitalia was prepped and draped in the usual manner.  #22 cystoscope was inserted down the urethra and bladder, and cystoscopy revealed no stones or tumors. The ureteral orifices were identified in the normal orthotopic position. Through a ureteral catheter into the cystoscope, and an Ultratrack wire was advanced into the ureter up to the level of the kidney.     Then the cystoscope was reinserted over the wire and a 6-26 coloplast double-J stent was advanced over the wire, with the proximal curl of visualized in the renal pelvis topography using fluoroscopy, while the distal curl was visualized in the bladder.    This concluded the procedure.    Patient tolerated the procedure well and was transferred to PACU in stable condition.     Evidence  of Infection: No   Complications:  None; patient tolerated the procedure well.    Disposition: PACU - hemodynamically stable.  Condition: stable         Additional Details:     Attending Attestation: I performed the procedure.    Eber Villalta  Phone Number: [phone]

## 2025-06-14 NOTE — DISCHARGE INSTRUCTIONS
Please follow up with your PCP for repeat creatinine level. Hold the Celebrex until the creatinine is better.   Someone will contact you to discuss left ESWL.

## 2025-06-14 NOTE — ANESTHESIA PROCEDURE NOTES
Airway  Date/Time: 6/14/2025 1:10 PM  Reason: elective    Airway not difficult    Staffing  Performed: attending   Authorized by: Juan Sherman MD    Performed by: Juan Sherman MD  Patient location during procedure: OR    Patient Condition  Indications for airway management: anesthesia  Patient position: sniffing  MILS maintained throughout  Sedation level: deep     Final Airway Details   Preoxygenated: yes  Final airway type: supraglottic airway  Successful airway:   Size: 4  Number of attempts at approach: 1

## 2025-06-14 NOTE — H&P (VIEW-ONLY)
Inpatient consult to Urology  Consult performed by: Eber Villalta MD  Consult ordered by: Bryant Magallon MD  Reason for consult: Left ureteral stone and acute kidney injury  Assessment/Recommendations: Keep n.p.o.  Pain control  IV hydration  IV antibiotics  Take 2 OR today for cystoscopy and left ureteral stent insertion          Reason For Consult  Left 8 mm ureteral stone  UMM    History Of Present Illness  Rj Adrian is a 60 y.o. male presenting with 3 of ankylosing spondylitis and ulcerative colitis s/p total colectomy, with left sided low back pain that started sharply yesterday, radiating to the lower abdomen.    This was associated with vomiting.  No fever or chills.    In ED, CT revealed an 8 mm stone in the left UPJ, along with left mild hydronephrosis.    Kidney function revealed rising creatinine suggestive of UMM, which worsened further today.     Past Medical History  He has a past medical history of Other iron deficiency anemias (10/15/2018) and Ulcerative colitis, unspecified, without complications (Multi) (10/14/2022).    Surgical History  He has no past surgical history on file.     Social History  He reports that he has never smoked. He has never used smokeless tobacco. He reports current alcohol use. He reports that he does not use drugs.    Family History  Family History[1]     Allergies  Epinephrine, Infliximab, and Diphenhydramine    Review of Systems   A complete review of systems was performed. All systems are noted to be negative unless indicated in the history of present illness, impression, active problem list, or past histories.    Physical Exam  Constitutional:       General: He is not in acute distress.     Appearance: Normal appearance. He is not toxic-appearing.   Cardiovascular:      Rate and Rhythm: Normal rate.   Pulmonary:      Effort: Pulmonary effort is normal.   Abdominal:      General: Abdomen is flat.      Palpations: Abdomen is soft.   Neurological:      Mental  "Status: He is alert.          Last Recorded Vitals  Blood pressure 144/83, pulse 97, temperature 36.3 °C (97.3 °F), temperature source Temporal, resp. rate 16, height 1.702 m (5' 7\"), weight 65.6 kg (144 lb 11.2 oz), SpO2 95%.    Relevant Results  Results for orders placed or performed during the hospital encounter of 06/13/25 (from the past 96 hours)   CBC and Auto Differential   Result Value Ref Range    WBC 11.0 4.4 - 11.3 x10*3/uL    nRBC 0.0 0.0 - 0.0 /100 WBCs    RBC 4.70 4.50 - 5.90 x10*6/uL    Hemoglobin 11.8 (L) 13.5 - 17.5 g/dL    Hematocrit 36.9 (L) 41.0 - 52.0 %    MCV 79 (L) 80 - 100 fL    MCH 25.1 (L) 26.0 - 34.0 pg    MCHC 32.0 32.0 - 36.0 g/dL    RDW 14.3 11.5 - 14.5 %    Platelets 319 150 - 450 x10*3/uL    Neutrophils % 76.4 40.0 - 80.0 %    Immature Granulocytes %, Automated 0.3 0.0 - 0.9 %    Lymphocytes % 9.3 13.0 - 44.0 %    Monocytes % 12.0 2.0 - 10.0 %    Eosinophils % 1.6 0.0 - 6.0 %    Basophils % 0.4 0.0 - 2.0 %    Neutrophils Absolute 8.41 (H) 1.20 - 7.70 x10*3/uL    Immature Granulocytes Absolute, Automated 0.03 0.00 - 0.70 x10*3/uL    Lymphocytes Absolute 1.02 (L) 1.20 - 4.80 x10*3/uL    Monocytes Absolute 1.32 (H) 0.10 - 1.00 x10*3/uL    Eosinophils Absolute 0.18 0.00 - 0.70 x10*3/uL    Basophils Absolute 0.04 0.00 - 0.10 x10*3/uL   Comprehensive metabolic panel   Result Value Ref Range    Glucose 95 74 - 99 mg/dL    Sodium 138 136 - 145 mmol/L    Potassium 4.1 3.5 - 5.3 mmol/L    Chloride 104 98 - 107 mmol/L    Bicarbonate 26 21 - 32 mmol/L    Anion Gap 12 10 - 20 mmol/L    Urea Nitrogen 13 6 - 23 mg/dL    Creatinine 1.44 (H) 0.50 - 1.30 mg/dL    eGFR 56 (L) >60 mL/min/1.73m*2    Calcium 8.7 8.6 - 10.3 mg/dL    Albumin 3.8 3.4 - 5.0 g/dL    Alkaline Phosphatase 62 33 - 120 U/L    Total Protein 6.9 6.4 - 8.2 g/dL    AST 23 9 - 39 U/L    Bilirubin, Total 0.3 0.0 - 1.2 mg/dL    ALT 14 10 - 52 U/L   Magnesium   Result Value Ref Range    Magnesium 1.73 1.60 - 2.40 mg/dL   Lactate   Result " Value Ref Range    Lactate 0.9 0.4 - 2.0 mmol/L   Lipase   Result Value Ref Range    Lipase 27 9 - 82 U/L   Troponin I, High Sensitivity, Initial   Result Value Ref Range    Troponin I, High Sensitivity 6 0 - 20 ng/L   Troponin, High Sensitivity, 1 Hour   Result Value Ref Range    Troponin I, High Sensitivity 5 0 - 20 ng/L   Urinalysis with Reflex Culture and Microscopic   Result Value Ref Range    Color, Urine Light-Yellow Light-Yellow, Yellow, Dark-Yellow    Appearance, Urine Clear Clear    Specific Gravity, Urine 1.040 (N) 1.005 - 1.035    pH, Urine 6.0 5.0, 5.5, 6.0, 6.5, 7.0, 7.5, 8.0    Protein, Urine NEGATIVE NEGATIVE, 10 (TRACE), 20 (TRACE) mg/dL    Glucose, Urine Normal Normal mg/dL    Blood, Urine 0.2 (2+) (A) NEGATIVE mg/dL    Ketones, Urine TRACE (A) NEGATIVE mg/dL    Bilirubin, Urine NEGATIVE NEGATIVE mg/dL    Urobilinogen, Urine Normal Normal mg/dL    Nitrite, Urine NEGATIVE NEGATIVE    Leukocyte Esterase, Urine NEGATIVE NEGATIVE   Extra Urine Gray Tube   Result Value Ref Range    Extra Tube Hold for add-ons.    Urinalysis Microscopic   Result Value Ref Range    WBC, Urine NONE 1-5, NONE /HPF    RBC, Urine 3-5 NONE, 1-2, 3-5 /HPF    Mucus, Urine FEW Reference range not established. /LPF   Basic metabolic panel   Result Value Ref Range    Glucose 109 (H) 74 - 99 mg/dL    Sodium 138 136 - 145 mmol/L    Potassium 3.4 (L) 3.5 - 5.3 mmol/L    Chloride 105 98 - 107 mmol/L    Bicarbonate 25 21 - 32 mmol/L    Anion Gap 11 10 - 20 mmol/L    Urea Nitrogen 14 6 - 23 mg/dL    Creatinine 1.57 (H) 0.50 - 1.30 mg/dL    eGFR 51 (L) >60 mL/min/1.73m*2    Calcium 7.8 (L) 8.6 - 10.3 mg/dL   CBC   Result Value Ref Range    WBC 7.5 4.4 - 11.3 x10*3/uL    nRBC 0.0 0.0 - 0.0 /100 WBCs    RBC 4.18 (L) 4.50 - 5.90 x10*6/uL    Hemoglobin 10.5 (L) 13.5 - 17.5 g/dL    Hematocrit 33.5 (L) 41.0 - 52.0 %    MCV 80 80 - 100 fL    MCH 25.1 (L) 26.0 - 34.0 pg    MCHC 31.3 (L) 32.0 - 36.0 g/dL    RDW 14.3 11.5 - 14.5 %    Platelets 253  150 - 450 x10*3/uL   Magnesium   Result Value Ref Range    Magnesium 1.64 1.60 - 2.40 mg/dL     CT abdomen pelvis w IV contrast  Result Date: 6/13/2025  Interpreted By:  Spencer Brothers, STUDY: CT ABDOMEN PELVIS W IV CONTRAST;  6/13/2025 8:34 pm   INDICATION: Signs/Symptoms:flank pain and generalized abd pina n/v history of bowel obstructions and kidney stones.   COMPARISON: None available   ACCESSION NUMBER(S): [id number]   ORDERING CLINICIAN: BING MCKEON   TECHNIQUE: Axial CT images of the abdomen and pelvis with coronal and sagittal reconstructed images obtained.  75 ML of Omnipaque 350 was administered intravenously without immediate complication.   FINDINGS: LOWER CHEST: There is mild dependent hypoventilatory change. Normal heart size. Trace coronary atherosclerotic calcification.   LIVER: Within normal limits.   BILE DUCTS: Normal caliber.   GALLBLADDER: No calcified stones. No wall thickening.   PANCREAS: Within normal limits.   SPLEEN: Within normal limits.   ADRENALS: Within normal limits.   KIDNEYS, URETERS, and BLADDER: No right hydronephrosis. Nonobstructing right upper pole renal calculi measuring up to 3 mm. There is delayed left renal enhancement, prominent left perinephric fat stranding, and mild left hydronephrosis secondary to obstructing 0.8 cm calculus at the left ureteropelvic junction. The left distal ureter is nondilated.   There is slight diffuse bladder wall thickening.   REPRODUCTIVE: Prostatomegaly measuring 5 cm transverse.   VESSELS: Mild atherosclerosis. No abdominal aortic aneurysm.   RETROPERITONEUM and LYMPH NODES: There are enlarged lymph nodes at the posterior aspect of the terminal ileum-rectal region, for example a conglomerate enlarged node measures 1.7 x 1.8 cm on image 115/161.   BOWEL: Mild distal esophageal wall thickening. Small hiatal hernia. Postsurgical change related to total colectomy and ileorectal or ileoanal anastomosis. There are anastomotic sutures  along the majority of the distal bowel proximal to the anus. There is mild wall thickening of the terminal ileum just proximal to the anastomosis; this region of bowel demonstrates prominent caliber without evidence of transition point to suggest obstruction. The remainder of the small bowel is unremarkable.   PERITONEUM: No ascites or free air. No fluid collection.   BODY WALL: Small fat containing inguinal hernias. Postsurgical changes of the ventral abdominal wall related to prior abdominal wall repair.   MUSCULOSKELETAL: There is ankylosis of the sacroiliac joints. 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 There is mild bilateral hip arthrosis. There are confluent syndesmophytes across the thoracic spine compatible with ankylosing spondylitis.       1. Obstructing 0.8 cm calculus at the left ureteropelvic junction with mild left hydronephrosis. 2. Postsurgical change related to total colectomy and ileorectal or ileoanal anastomosis. There is mild wall thickening of the distal ileum near the anastomosis raising concern for enteritis. This region of bowel demonstrates prominent caliber without evidence of transition point to suggest obstruction. 3. Enlarged lymph nodes at the posterior aspect of the terminal ileum-rectal region measuring up to 1.8 cm, reactive or neoplastic. 4. Mild distal esophageal wall thickening. Please correlate for esophagitis. 5. Prostatomegaly. 6. Findings of ankylosing spondylitis. 7. Nonobstructing right upper pole renal calculi measuring up to 3 mm.     Signed by: Spencer Brothers 6/13/2025 9:04 PM Dictation workstation:   [id number]    XR chest 1 view  Result Date: 6/13/2025  Interpreted By:  Yanet Dela Cruz, STUDY: XR CHEST 1 VIEW; 6/13/2025 7:58 pm   INDICATION: Signs/Symptoms:sob   COMPARISON: Radiographs 01/27/2005   ACCESSION NUMBER(S): [id number]   ORDERING CLINICIAN: BING MCKEON   TECHNIQUE: Single frontal view of the chest performed.    FINDINGS:   LINES AND DEVICES: None.   LUNGS: Mild right basilar airspace opacities. No pleural effusion, pulmonary edema or pneumothorax.   CARDIOMEDIASTINAL SILHOUETTE: The cardiomediastinal silhouette is within normal limits.   OTHER: Mild irregularity of the right 8th posterior rib is noted. Remote fracture deformity of right 7th posterior rib.         Mild irregularly of the right 8th posterior rib may reflect an age-indeterminate fracture or artifact; correlate with point tenderness.   Mild right basilar airspace opacities may represent atelectasis and/or developing infection in the appropriate clinical context.   MACRO None   Signed by: Yanet Dela Cruz 6/13/2025 8:03 PM Dictation workstation:   [id number]    XR thumb left MIN 2 views  Result Date: 5/29/2025  Interpreted By:  Marcos Wells, STUDY: XR THUMB LEFT MIN 2 VIEWS; ;  5/28/2025 3:05 pm   INDICATION: Signs/Symptoms:LT THUMB PAIN.   ,M79.645 Pain in left finger(s)   COMPARISON: None.   ACCESSION NUMBER(S): [id number]   ORDERING CLINICIAN: MOHSEN CHRISTIAN   FINDINGS: Bony structures are intact. Minimal degenerative change at the IP joint space of the thumb. Mild-to-moderate degenerative change at the 1st CMC joint space. Joint spaces are otherwise maintained.       Degenerative change worse at the 1st CMC joint space. No fracture or dislocation.     MACRO: None   Signed by: Marcos Wells 5/29/2025 9:19 AM Dictation workstation:   [id number]         Assessment/Plan   70-year-old male with left ureteral stone and UMM, today kidney function slightly worse.    I personally reviewed the medical records of the patient including the note of the referring physician including the CT images as well as report, focusing on the left ureteral stone and appearance of the left kidney.    I discussed with the patient his options including cystoscopy and ureteral stent, ESWL, ureteroscopy, and the need to stage those considering his UMM that has worsened since yesterday,  and the emergency aspect that prevents us from performing ESWL or using the laser.    The optimal course of action would be inserting a stent now, and deferring the stone management procedure for another 2 to 3 weeks, giving time for the kidney to recover and the ureter to dilate.    We will also examine if the stone is radiopaque today to assess the feasibility of ESWL.    Plan:  Keep n.p.o.  Pain control  IV hydration  IV antibiotics  Take 2 OR today for cystoscopy and left ureteral stent insertion                 [1] No family history on file.

## 2025-06-14 NOTE — DISCHARGE SUMMARY
Discharge Diagnosis  Left ureteral calculus with mild obstructive uropathy s/p stent placement      Issues Requiring Follow-Up  Scheduling for ESWL  Repeat RFP    Discharge Meds     Medication List      PAUSE taking these medications     celecoxib 200 mg capsule; Wait to take this until your doctor or other   care provider tells you to start again.; Commonly known as: CeleBREX; Take   1 capsule (200 mg) by mouth once daily as needed for mild pain (1 - 3).     START taking these medications     oxyBUTYnin 5 mg tablet; Commonly known as: Ditropan; Take 1 tablet (5   mg) by mouth 3 times a day as needed (bladder spasms).   oxyCODONE 5 mg immediate release tablet; Commonly known as: Roxicodone;   Take 1 tablet (5 mg) by mouth every 4 hours if needed for moderate pain (4   - 6) for up to 3 days.     CONTINUE taking these medications     EnbreL SureClick 50 mg/mL (1 mL) pen injector pen injection; Generic   drug: etanercept; INJECT 50 MG (1 ML) UNDER THE SKIN ONCE A WEEK   multivitamin tablet   traZODone 50 mg tablet; Commonly known as: Desyrel; Take 1-2 tablets   ( mg) by mouth as needed at bedtime for sleep.     ASK your doctor about these medications     lidocaine 5 % patch; Commonly known as: Lidoderm; Place 1 patch over 12   hours on the skin once daily. Remove & discard patch within 12 hours or as   directed by MD.       Test Results Pending At Discharge  Pending Labs       No current pending labs.            Hospital Course    Rj Adrian is a 60 y.o. male with history of ankylosing spondylitis, ulcerative colitis s/p total colectomy, and nephrolithiasis presenting with left-sided low back pain.         Left ureteral calculus with mild obstructive uropathy s/p CYSTOSCOPY, WITH URETERAL STENT INSERTION   UMM  -Calculus is 0.8 cm and thus unlikely to pass spontaneously  -Has evidence of UMM and mild hydronephrosis  -Pain control  -Avoidance of nephrotoxic meds. Please hold the Celebrex until the creatinine is  better. Follow up for repeat level in 1 week.   -Someone will contact you to discuss left ESWL  -Discharged with oxycodone and oxybutynin   -Urology to schedule evaluation for left ESWL     Discharge time > 30 minutes    Pertinent Physical Exam At Time of Discharge  Physical Exam  Eyes:      Extraocular Movements: Extraocular movements intact.      Conjunctiva/sclera: Conjunctivae normal.   Cardiovascular:      Rate and Rhythm: Regular rhythm.      Pulses: Normal pulses.      Heart sounds: Normal heart sounds.   Pulmonary:      Effort: Pulmonary effort is normal.      Breath sounds: Normal breath sounds.   Abdominal:      General: Abdomen is flat.      Palpations: Abdomen is soft.   Musculoskeletal:         General: Normal range of motion.   Skin:     General: Skin is warm and dry.   Neurological:      Mental Status: He is alert and oriented to person, place, and time.   Psychiatric:         Mood and Affect: Mood normal.         Behavior: Behavior normal.         Thought Content: Thought content normal.         Judgment: Judgment normal.         Outpatient Follow-Up  No future appointments.      Peggy Daigle, APRN-CNP

## 2025-06-14 NOTE — PROGRESS NOTES
06/14/25 1442   Discharge Planning   Living Arrangements Spouse/significant other   Support Systems Spouse/significant other   Assistance Needed A&ox4, independent with ADLs, no DME, drives, room air, no cpap or bipap, not active with any HHC, PCP is Sharmin Moe   Type of Residence Private residence   Number of Stairs to Enter Residence 3   Number of Stairs Within Residence 12   Do you have animals or pets at home? Yes   Type of Animals or Pets 2 dogs   Who is requesting discharge planning? Provider   Home or Post Acute Services None   Expected Discharge Disposition Home  (denies any HHC needs)   Does the patient need discharge transport arranged? No   Financial Resource Strain   How hard is it for you to pay for the very basics like food, housing, medical care, and heating? Not hard   Housing Stability   In the last 12 months, was there a time when you were not able to pay the mortgage or rent on time? N   In the past 12 months, how many times have you moved where you were living? 0   At any time in the past 12 months, were you homeless or living in a shelter (including now)? N   Transportation Needs   In the past 12 months, has lack of transportation kept you from medical appointments or from getting medications? no   In the past 12 months, has lack of transportation kept you from meetings, work, or from getting things needed for daily living? No   Stroke Family Assessment   Stroke Family Assessment Needed No   Intensity of Service   Intensity of Service 0-30 min

## 2025-06-16 ENCOUNTER — PREP FOR PROCEDURE (OUTPATIENT)
Dept: UROLOGY | Facility: HOSPITAL | Age: 60
End: 2025-06-16
Payer: COMMERCIAL

## 2025-06-16 ENCOUNTER — PATIENT OUTREACH (OUTPATIENT)
Dept: PRIMARY CARE | Facility: CLINIC | Age: 60
End: 2025-06-16
Payer: COMMERCIAL

## 2025-06-16 DIAGNOSIS — N20.0 KIDNEY STONE ON LEFT SIDE: Primary | ICD-10-CM

## 2025-06-16 NOTE — PROGRESS NOTES
Discharge Facility:[facility]  Discharge Diagnosis:Left ureteral calculus   Admission Date:6/13/25  Discharge Date: 6/14/25    PCP Appointment Date:none available sent o pcp office  Specialist Appointment Date:   Hospital Encounter and Summary Linked: Yes/ED to Hosp-Admission (Discharged) with Petra Espinosa MD; Kalina Mccord DO (06/13/2025)   See discharge assessment below for further details  Wrap Up  Wrap Up Additional Comments: discused discharge patiemt stated that he is doing ok he will be having a lithotripsy at the end of the week. provided contavt information encouraged call with questions. (6/16/2025  1:18 PM)    Engagement  Call Start Time: 0118 (6/16/2025  1:18 PM)    Medications  Medications reviewed with patient/caregiver?: Yes (ditropan 5mg oxycodone 5mg) (6/16/2025  1:18 PM)  Is the patient having any side effects they believe may be caused by any medication additions or changes?: No (6/16/2025  1:18 PM)  Does the patient have all medications ordered at discharge?: Yes (6/16/2025  1:18 PM)  Is the patient taking all medications as directed (includes completed medication regime)?: Yes (6/16/2025  1:18 PM)    Appointments  Does the patient have a primary care provider?: Yes (6/16/2025  1:18 PM)  Has the patient kept scheduled appointments due by today?: Yes (6/16/2025  1:18 PM)    Self Management  What is the home health agency?: N/A (6/16/2025  1:18 PM)  Has home health visited the patient within 72 hours of discharge?: Not applicable (6/16/2025  1:18 PM)  What Durable Medical Equipment (DME) was ordered?: N/A (6/16/2025  1:18 PM)  Has all Durable Medical Equipment (DME) been delivered?: No (6/16/2025  1:18 PM)    Patient Teaching  Does the patient have access to their discharge instructions?: Yes (6/16/2025  1:18 PM)  Care Management Interventions: Reviewed instructions with patient (6/16/2025  1:18 PM)  What is the patient's perception of their health status since discharge?: Improving (6/16/2025   1:18 PM)  Is the patient/caregiver able to teach back the hierarchy of who to call/visit for symptoms/problems? PCP, Specialist, Home Health nurse, Urgent Care, ED, 911: Yes (6/16/2025  1:18 PM)

## 2025-06-17 ENCOUNTER — PRE-ADMISSION TESTING (OUTPATIENT)
Dept: PREADMISSION TESTING | Facility: HOSPITAL | Age: 60
End: 2025-06-17
Payer: COMMERCIAL

## 2025-06-17 ENCOUNTER — ANESTHESIA EVENT (OUTPATIENT)
Dept: OPERATING ROOM | Facility: HOSPITAL | Age: 60
End: 2025-06-17
Payer: COMMERCIAL

## 2025-06-17 VITALS — BODY MASS INDEX: 23.54 KG/M2 | WEIGHT: 150 LBS | HEIGHT: 67 IN

## 2025-06-17 ASSESSMENT — DUKE ACTIVITY SCORE INDEX (DASI)
CAN YOU DO HEAVY WORK AROUND THE HOUSE LIKE SCRUBBING FLOORS OR LIFTING AND MOVING HEAVY FURNITURE: YES
CAN YOU TAKE CARE OF YOURSELF (EAT, DRESS, BATHE, OR USE TOILET): YES
CAN YOU WALK INDOORS, SUCH AS AROUND YOUR HOUSE: YES
CAN YOU CLIMB A FLIGHT OF STAIRS OR WALK UP A HILL: YES
CAN YOU DO LIGHT WORK AROUND THE HOUSE LIKE DUSTING OR WASHING DISHES: YES
CAN YOU DO MODERATE WORK AROUND THE HOUSE LIKE VACUUMING, SWEEPING FLOORS OR CARRYING GROCERIES: YES
CAN YOU WALK A BLOCK OR TWO ON LEVEL GROUND: YES
CAN YOU PARTICIPATE IN STRENOUS SPORTS LIKE SWIMMING, SINGLES TENNIS, FOOTBALL, BASKETBALL, OR SKIING: NO
CAN YOU HAVE SEXUAL RELATIONS: YES
CAN YOU PARTICIPATE IN MODERATE RECREATIONAL ACTIVITIES LIKE GOLF, BOWLING, DANCING, DOUBLES TENNIS OR THROWING A BASEBALL OR FOOTBALL: YES
CAN YOU RUN A SHORT DISTANCE: YES

## 2025-06-17 NOTE — ANESTHESIA PREPROCEDURE EVALUATION
Patient: Rj Adrian    Procedure Information       Date/Time: 06/20/25 1400    Procedure: LITHOTRIPSY, [facility]L ([facility][id number] (Left)    Location: [facility] OR 01 / Virtual [facility] OR    Surgeons: Eber Villalta MD            Relevant Problems   Anesthesia (within normal limits)      Cardiac   (+) Aortic valve regurgitation   (+) Mixed hyperlipidemia      Pulmonary (within normal limits)      Neuro   (+) Anxiety disorder      GI   (+) Ulcerative colitis      /Renal   (+) Kidney stone on left side      Liver (within normal limits)      Endocrine (within normal limits)      Hematology   (+) Absolute anemia   (+) Anemia   (+) Iron deficiency anemia      Musculoskeletal   (+) Osteoarthritis of metacarpophalangeal (MCP) joint      HEENT (within normal limits)      ID (within normal limits)      Skin   (+) Rash      GYN (within normal limits)     There were no vitals filed for this visit.    Surgical History[1]  Medical History[2]  Current Medications[3]  Prior to Admission medications    Medication Sig Start Date End Date Taking? Authorizing Provider   celecoxib (CeleBREX) 200 mg capsule Take 1 capsule (200 mg) by mouth once daily as needed for mild pain (1 - 3). 4/30/25   Sharmin Moe MD   EnbreL SureClick 50 mg/mL (1 mL) pen injector pen injection INJECT 50 MG (1 ML) UNDER THE SKIN ONCE A WEEK 9/16/24   Sharmin Moe MD   lidocaine (Lidoderm) 5 % patch Place 1 patch over 12 hours on the skin once daily. Remove & discard patch within 12 hours or as directed by MD.  Patient not taking: Reported on 6/13/2025 2/15/25   Dmitriy Hart PA-C   multivitamin tablet Take by mouth. 8/15/07   Historical Provider, MD   oxyBUTYnin (Ditropan) 5 mg tablet Take 1 tablet (5 mg) by mouth 3 times a day as needed (bladder spasms). 6/14/25 7/14/25  Peggy Daigle APRN-CNP   oxyCODONE (Roxicodone) 5 mg immediate release tablet Take 1 tablet (5 mg) by mouth every 4 hours if needed for moderate pain (4 - 6) for up to 3 days.  "6/14/25 6/17/25  Peggy Daigle, APRN-CNP   traZODone (Desyrel) 50 mg tablet Take 1-2 tablets ( mg) by mouth as needed at bedtime for sleep. 2/11/25   Sharmin Moe MD     RX Allergies[4]  Social History     Tobacco Use    Smoking status: Never    Smokeless tobacco: Never   Substance Use Topics    Alcohol use: Yes     Comment: occ         Chemistry    Lab Results   Component Value Date/Time     06/14/2025 0604    K 3.4 (L) 06/14/2025 0604     06/14/2025 0604    CO2 25 06/14/2025 0604    BUN 14 06/14/2025 0604    CREATININE 1.57 (H) 06/14/2025 0604    Lab Results   Component Value Date/Time    CALCIUM 7.8 (L) 06/14/2025 0604    ALKPHOS 62 06/13/2025 1943    AST 23 06/13/2025 1943    ALT 14 06/13/2025 1943    BILITOT 0.3 06/13/2025 1943          Lab Results   Component Value Date/Time    WBC 7.5 06/14/2025 0604    HGB 10.5 (L) 06/14/2025 0604    HCT 33.5 (L) 06/14/2025 0604     06/14/2025 0604     No results found for: \"PROTIME\", \"PTT\", \"INR\"  No results found for this or any previous visit (from the past 4464 hours).  No results found for this or any previous visit from the past 1095 days.    Clinical information reviewed:                 Chart reviewed.  No clearances ordered.  Recent urology procedure under [facility] 6/14/25, no anesthesia complications or issues.    NPO Detail:  No data recorded     Physical Exam    Airway  Mallampati: II  TM distance: >3 FB  Neck ROM: full  Mouth opening: 3 or more finger widths     Cardiovascular    Dental - normal exam     Pulmonary    Abdominal            Anesthesia Plan    History of general anesthesia?: yes  History of complications of general anesthesia?: no    ASA 2     general     intravenous induction   Anesthetic plan and risks discussed with patient.             [1]   Past Surgical History:  Procedure Laterality Date    CYSTOSCOPY W/ URETERAL STENT PLACEMENT Left 06/14/2025    TOTAL COLECTOMY     [2]   Past Medical History:  Diagnosis Date    " Ankylosing spondylitis     Hyperlipidemia     Other iron deficiency anemias 10/15/2018    Other iron deficiency anemia    Ulcerative colitis, unspecified, without complications (Multi) 10/14/2022    Ulcerative colitis   [3] No current facility-administered medications for this encounter.    Current Outpatient Medications:     [Paused] celecoxib (CeleBREX) 200 mg capsule, Take 1 capsule (200 mg) by mouth once daily as needed for mild pain (1 - 3)., Disp: 90 capsule, Rfl: 3    EnbreL SureClick 50 mg/mL (1 mL) pen injector pen injection, INJECT 50 MG (1 ML) UNDER THE SKIN ONCE A WEEK, Disp: 4 mL, Rfl: 11    lidocaine (Lidoderm) 5 % patch, Place 1 patch over 12 hours on the skin once daily. Remove & discard patch within 12 hours or as directed by MD. (Patient not taking: Reported on 6/13/2025), Disp: 5 patch, Rfl: 0    multivitamin tablet, Take by mouth., Disp: , Rfl:     oxyBUTYnin (Ditropan) 5 mg tablet, Take 1 tablet (5 mg) by mouth 3 times a day as needed (bladder spasms)., Disp: 90 tablet, Rfl: 0    oxyCODONE (Roxicodone) 5 mg immediate release tablet, Take 1 tablet (5 mg) by mouth every 4 hours if needed for moderate pain (4 - 6) for up to 3 days., Disp: 15 tablet, Rfl: 0    traZODone (Desyrel) 50 mg tablet, Take 1-2 tablets ( mg) by mouth as needed at bedtime for sleep., Disp: 180 tablet, Rfl: 3  [4]   Allergies  Allergen Reactions    Epinephrine Unknown     Myocardial Infarction with IV dose of 1:1000    Infliximab Unknown     Severe hypotension when given without steroid    Diphenhydramine Hives and Rash

## 2025-06-17 NOTE — PREPROCEDURE INSTRUCTIONS
Medication List            Accurate as of June 17, 2025  3:10 PM. Always use your most recent med list.                PAUSE taking these medications      celecoxib 200 mg capsule  Wait to take this until your doctor or other care provider tells you to start again.  Commonly known as: CeleBREX  Take 1 capsule (200 mg) by mouth once daily as needed for mild pain (1 - 3).  Additional Medication Adjustments for Surgery: Other (Comment)            TAKE these medications      EnbreL SureClick 50 mg/mL (1 mL) pen injector pen injection  Generic drug: etanercept  INJECT 50 MG (1 ML) UNDER THE SKIN ONCE A WEEK  Medication Adjustments for Surgery: Take/Use as prescribed     multivitamin tablet  Medication Adjustments for Surgery: Take last dose 3 days before surgery     oxyBUTYnin 5 mg tablet  Commonly known as: Ditropan  Take 1 tablet (5 mg) by mouth 3 times a day as needed (bladder spasms).  Medication Adjustments for Surgery: Take last dose 1 day (24 hours) before surgery     oxyCODONE 5 mg immediate release tablet  Commonly known as: Roxicodone  Take 1 tablet (5 mg) by mouth every 4 hours if needed for moderate pain (4 - 6) for up to 3 days.  Medication Adjustments for Surgery: Take/Use as prescribed     traZODone 50 mg tablet  Commonly known as: Desyrel  Take 1-2 tablets ( mg) by mouth as needed at bedtime for sleep.  Medication Adjustments for Surgery: Take last dose 1 day (24 hours) before surgery                              NPO Instructions:    Do not eat any food after midnight the night before your surgery/procedure.  May have clears up to 3 hours preop. Water, Black coffee, tea, gatorade, and clear soda. No dairy or non-dairy products added.   Remember to stop drinking by mouth 3 hours prior to procedure. No gum, candy, mints or smoking.      Additional Instructions:     Review your medication instructions, take indicated medications  Wear  comfortable loose fitting clothing  All jewelry and valuables  should be left at home    Park in back of hospital by ER. Come up to Second floor-Outpt dept to check in.  Bring Photo ID and Insurance card,   You MUST have a  with you.  No more than 2 visitors with you please.  There is construction around the hospital- best to take [address] [facility] to the hospital.     If you get ill at all before your procedure- CALL YOUR DOCTOR/SURGEON.  We want you in the best shape that is possible. Any sickness might lead to your procedure being delayed.      Call Outpatient dept at [phone] the night before your procedure (Friday for Monday procedure), between 1-3 pm.     **If you start any new medications, especially for weight loss or antibiotics-let us know. Outpatient dept number is [phone] (M-F 7-3:30p).

## 2025-06-18 LAB
ATRIAL RATE: 74 BPM
P AXIS: 41 DEGREES
P OFFSET: 204 MS
P ONSET: 173 MS
PR INTERVAL: 102 MS
Q ONSET: 224 MS
QRS COUNT: 12 BEATS
QRS DURATION: 74 MS
QT INTERVAL: 382 MS
QTC CALCULATION(BAZETT): 424 MS
QTC FREDERICIA: 410 MS
R AXIS: 1 DEGREES
T AXIS: 30 DEGREES
T OFFSET: 415 MS
VENTRICULAR RATE: 74 BPM

## 2025-06-19 RX ORDER — APREPITANT 40 MG/1
40 CAPSULE ORAL ONCE
Status: CANCELLED | OUTPATIENT
Start: 2025-06-19 | End: 2025-06-19

## 2025-06-19 RX ORDER — FENTANYL CITRATE 50 UG/ML
25 INJECTION, SOLUTION INTRAMUSCULAR; INTRAVENOUS EVERY 5 MIN PRN
Refills: 0 | Status: CANCELLED | OUTPATIENT
Start: 2025-06-19

## 2025-06-19 RX ORDER — ONDANSETRON HYDROCHLORIDE 2 MG/ML
4 INJECTION, SOLUTION INTRAVENOUS ONCE AS NEEDED
Status: CANCELLED | OUTPATIENT
Start: 2025-06-19

## 2025-06-19 RX ORDER — ACETAMINOPHEN 325 MG/1
650 TABLET ORAL EVERY 4 HOURS PRN
Status: CANCELLED | OUTPATIENT
Start: 2025-06-19

## 2025-06-19 RX ORDER — OXYCODONE HYDROCHLORIDE 5 MG/1
5 TABLET ORAL EVERY 4 HOURS PRN
Refills: 0 | Status: CANCELLED | OUTPATIENT
Start: 2025-06-19

## 2025-06-20 ENCOUNTER — HOSPITAL ENCOUNTER (OUTPATIENT)
Facility: HOSPITAL | Age: 60
Setting detail: OUTPATIENT SURGERY
Discharge: HOME | End: 2025-06-20
Attending: STUDENT IN AN ORGANIZED HEALTH CARE EDUCATION/TRAINING PROGRAM | Admitting: STUDENT IN AN ORGANIZED HEALTH CARE EDUCATION/TRAINING PROGRAM
Payer: COMMERCIAL

## 2025-06-20 ENCOUNTER — ANESTHESIA (OUTPATIENT)
Dept: OPERATING ROOM | Facility: HOSPITAL | Age: 60
End: 2025-06-20
Payer: COMMERCIAL

## 2025-06-20 VITALS
HEART RATE: 85 BPM | BODY MASS INDEX: 23.54 KG/M2 | RESPIRATION RATE: 16 BRPM | TEMPERATURE: 98.7 F | OXYGEN SATURATION: 100 % | DIASTOLIC BLOOD PRESSURE: 88 MMHG | HEIGHT: 67 IN | WEIGHT: 150 LBS | SYSTOLIC BLOOD PRESSURE: 129 MMHG

## 2025-06-20 DIAGNOSIS — N20.0 KIDNEY STONE ON LEFT SIDE: Primary | ICD-10-CM

## 2025-06-20 PROCEDURE — 7100000001 HC RECOVERY ROOM TIME - INITIAL BASE CHARGE: Performed by: STUDENT IN AN ORGANIZED HEALTH CARE EDUCATION/TRAINING PROGRAM

## 2025-06-20 PROCEDURE — 7100000009 HC PHASE TWO TIME - INITIAL BASE CHARGE: Performed by: STUDENT IN AN ORGANIZED HEALTH CARE EDUCATION/TRAINING PROGRAM

## 2025-06-20 PROCEDURE — 3700000001 HC GENERAL ANESTHESIA TIME - INITIAL BASE CHARGE: Performed by: STUDENT IN AN ORGANIZED HEALTH CARE EDUCATION/TRAINING PROGRAM

## 2025-06-20 PROCEDURE — 2500000005 HC RX 250 GENERAL PHARMACY W/O HCPCS: Performed by: NURSE ANESTHETIST, CERTIFIED REGISTERED

## 2025-06-20 PROCEDURE — 3600000003 HC OR TIME - INITIAL BASE CHARGE - PROCEDURE LEVEL THREE: Performed by: STUDENT IN AN ORGANIZED HEALTH CARE EDUCATION/TRAINING PROGRAM

## 2025-06-20 PROCEDURE — 50590 FRAGMENTING OF KIDNEY STONE: CPT | Performed by: STUDENT IN AN ORGANIZED HEALTH CARE EDUCATION/TRAINING PROGRAM

## 2025-06-20 PROCEDURE — 7100000010 HC PHASE TWO TIME - EACH INCREMENTAL 1 MINUTE: Performed by: STUDENT IN AN ORGANIZED HEALTH CARE EDUCATION/TRAINING PROGRAM

## 2025-06-20 PROCEDURE — 2500000004 HC RX 250 GENERAL PHARMACY W/ HCPCS (ALT 636 FOR OP/ED): Performed by: NURSE ANESTHETIST, CERTIFIED REGISTERED

## 2025-06-20 PROCEDURE — 3600000008 HC OR TIME - EACH INCREMENTAL 1 MINUTE - PROCEDURE LEVEL THREE: Performed by: STUDENT IN AN ORGANIZED HEALTH CARE EDUCATION/TRAINING PROGRAM

## 2025-06-20 PROCEDURE — 7100000002 HC RECOVERY ROOM TIME - EACH INCREMENTAL 1 MINUTE: Performed by: STUDENT IN AN ORGANIZED HEALTH CARE EDUCATION/TRAINING PROGRAM

## 2025-06-20 PROCEDURE — 3700000002 HC GENERAL ANESTHESIA TIME - EACH INCREMENTAL 1 MINUTE: Performed by: STUDENT IN AN ORGANIZED HEALTH CARE EDUCATION/TRAINING PROGRAM

## 2025-06-20 PROCEDURE — 52310 CYSTOSCOPY AND TREATMENT: CPT | Performed by: STUDENT IN AN ORGANIZED HEALTH CARE EDUCATION/TRAINING PROGRAM

## 2025-06-20 RX ORDER — PROPOFOL 10 MG/ML
INJECTION, EMULSION INTRAVENOUS AS NEEDED
Status: DISCONTINUED | OUTPATIENT
Start: 2025-06-20 | End: 2025-06-20

## 2025-06-20 RX ORDER — MIDAZOLAM HYDROCHLORIDE 1 MG/ML
INJECTION INTRAMUSCULAR; INTRAVENOUS AS NEEDED
Status: DISCONTINUED | OUTPATIENT
Start: 2025-06-20 | End: 2025-06-20

## 2025-06-20 RX ORDER — FENTANYL CITRATE 50 UG/ML
INJECTION, SOLUTION INTRAMUSCULAR; INTRAVENOUS AS NEEDED
Status: DISCONTINUED | OUTPATIENT
Start: 2025-06-20 | End: 2025-06-20

## 2025-06-20 RX ORDER — LIDOCAINE HYDROCHLORIDE 20 MG/ML
INJECTION, SOLUTION EPIDURAL; INFILTRATION; INTRACAUDAL; PERINEURAL AS NEEDED
Status: DISCONTINUED | OUTPATIENT
Start: 2025-06-20 | End: 2025-06-20

## 2025-06-20 RX ORDER — ONDANSETRON HYDROCHLORIDE 2 MG/ML
INJECTION, SOLUTION INTRAVENOUS AS NEEDED
Status: DISCONTINUED | OUTPATIENT
Start: 2025-06-20 | End: 2025-06-20

## 2025-06-20 RX ORDER — PHENYLEPHRINE HYDROCHLORIDE 10 MG/ML
INJECTION INTRAVENOUS AS NEEDED
Status: DISCONTINUED | OUTPATIENT
Start: 2025-06-20 | End: 2025-06-20

## 2025-06-20 RX ORDER — CEFAZOLIN SODIUM 2 G/50ML
2 SOLUTION INTRAVENOUS ONCE
Status: DISCONTINUED | OUTPATIENT
Start: 2025-06-20 | End: 2025-06-20 | Stop reason: HOSPADM

## 2025-06-20 RX ORDER — NORETHINDRONE AND ETHINYL ESTRADIOL 0.5-0.035
KIT ORAL AS NEEDED
Status: DISCONTINUED | OUTPATIENT
Start: 2025-06-20 | End: 2025-06-20

## 2025-06-20 RX ORDER — CEFAZOLIN 1 G/1
INJECTION, POWDER, FOR SOLUTION INTRAVENOUS AS NEEDED
Status: DISCONTINUED | OUTPATIENT
Start: 2025-06-20 | End: 2025-06-20

## 2025-06-20 RX ORDER — SODIUM CHLORIDE, SODIUM LACTATE, POTASSIUM CHLORIDE, CALCIUM CHLORIDE 600; 310; 30; 20 MG/100ML; MG/100ML; MG/100ML; MG/100ML
INJECTION, SOLUTION INTRAVENOUS CONTINUOUS PRN
Status: DISCONTINUED | OUTPATIENT
Start: 2025-06-20 | End: 2025-06-20

## 2025-06-20 RX ADMIN — CEFAZOLIN 2 G: 330 INJECTION, POWDER, FOR SOLUTION INTRAMUSCULAR; INTRAVENOUS at 12:39

## 2025-06-20 RX ADMIN — MIDAZOLAM HYDROCHLORIDE 2 MG: 1 INJECTION, SOLUTION INTRAMUSCULAR; INTRAVENOUS at 12:36

## 2025-06-20 RX ADMIN — PROPOFOL 50 MG: 10 INJECTION, EMULSION INTRAVENOUS at 13:16

## 2025-06-20 RX ADMIN — EPHEDRINE SULFATE 10 MG: 50 INJECTION, SOLUTION INTRAVENOUS at 13:01

## 2025-06-20 RX ADMIN — DEXAMETHASONE SODIUM PHOSPHATE 8 MG: 4 INJECTION, SOLUTION INTRAMUSCULAR; INTRAVENOUS at 12:39

## 2025-06-20 RX ADMIN — EPHEDRINE SULFATE 10 MG: 50 INJECTION, SOLUTION INTRAVENOUS at 12:59

## 2025-06-20 RX ADMIN — FENTANYL CITRATE 25 MCG: 50 INJECTION, SOLUTION INTRAMUSCULAR; INTRAVENOUS at 12:39

## 2025-06-20 RX ADMIN — FENTANYL CITRATE 50 MCG: 50 INJECTION, SOLUTION INTRAMUSCULAR; INTRAVENOUS at 13:16

## 2025-06-20 RX ADMIN — FENTANYL CITRATE 25 MCG: 50 INJECTION, SOLUTION INTRAMUSCULAR; INTRAVENOUS at 12:49

## 2025-06-20 RX ADMIN — LIDOCAINE HYDROCHLORIDE 40 MG: 20 INJECTION, SOLUTION EPIDURAL; INFILTRATION; INTRACAUDAL; PERINEURAL at 12:39

## 2025-06-20 RX ADMIN — PHENYLEPHRINE HYDROCHLORIDE 100 MCG: 10 INJECTION INTRAVENOUS at 12:58

## 2025-06-20 RX ADMIN — PHENYLEPHRINE HYDROCHLORIDE 100 MCG: 10 INJECTION INTRAVENOUS at 12:55

## 2025-06-20 RX ADMIN — SODIUM CHLORIDE, SODIUM LACTATE, POTASSIUM CHLORIDE, CALCIUM CHLORIDE: 600; 310; 30; 20 INJECTION, SOLUTION INTRAVENOUS at 12:36

## 2025-06-20 RX ADMIN — PROPOFOL 200 MG: 10 INJECTION, EMULSION INTRAVENOUS at 12:39

## 2025-06-20 RX ADMIN — ONDANSETRON 4 MG: 2 INJECTION, SOLUTION INTRAMUSCULAR; INTRAVENOUS at 12:39

## 2025-06-20 ASSESSMENT — PAIN - FUNCTIONAL ASSESSMENT
PAIN_FUNCTIONAL_ASSESSMENT: 0-10

## 2025-06-20 ASSESSMENT — PAIN SCALES - GENERAL
PAINLEVEL_OUTOF10: 0 - NO PAIN
PAIN_LEVEL: 0
PAINLEVEL_OUTOF10: 0 - NO PAIN
PAINLEVEL_OUTOF10: 2

## 2025-06-20 NOTE — OP NOTE
EXTRACORPOREAL SHOCK WAVE LITHOTRIPSY, POSSIBLE CYSTOSCOPY AND STENT REMOVAL (L), CYSTOSCOPY, WITH URETERAL STENT REMOVAL Operative Note     Date: 2025  OR Location: [facility] OR    Name: Rj Adrian : 1965, Age: 60 y.o., MRN: [id number], Sex: male    Diagnosis  Pre-op Diagnosis      * Kidney stone on left side [N20.0] Post-op Diagnosis     * Kidney stone on left side [N20.0]     Procedures  EXTRACORPOREAL SHOCK WAVE LITHOTRIPSY, POSSIBLE CYSTOSCOPY AND STENT REMOVAL  21191 - [place] LITHOTRIPSY XTRCORP SHOCK WAVE    71804 - CYSTOSCOPY, WITH URETERAL STENT REMOVAL      Surgeons      * Eber Villalta - Primary    Resident/Fellow/Other Assistant:  Surgeons and Role:  * No surgeons found with a matching role *    Staff:   Andreaulator: Ethel  Circulator: Jo Purvis Person: Ly Purvis Person: Maria Luisa  Circulator: Colleen    Anesthesia Staff: CRNA: ROSA Mendez-CRNA    Procedure Summary  Anesthesia: General  ASA: II  Estimated Blood Loss: 0 mL  Intra-op Medications:   Administrations occurring from 1200 to 1315 on 25:   Medication Name Total Dose   ceFAZolin (Ancef) vial 1 g 2 g   dexAMETHasone (Decadron) 4 mg/mL IV Syringe 2 mL 8 mg   ePHEDrine injection 20 mg   fentaNYL (Sublimaze) injection 50 mcg/mL 50 mcg   LR infusion 270.62 mL   lidocaine PF (Xylocaine-MPF) local injection 2 % 40 mg   midazolam PF (Versed) injection 1 mg/mL 2 mg   ondansetron (Zofran) 2 mg/mL injection 4 mg   phenylephrine (Nemesio-Synephrine) injection 200 mcg   propofol (Diprivan) IV infusion 200 mg              Anesthesia Record               Intraprocedure I/O Totals          Intake    Propofol Drip 0.00 mL    The total shown is the total volume documented since Anesthesia Start was filed.    LR infusion 500.00 mL    Total Intake 500 mL          Specimen: No specimens collected              Drains and/or Catheters: * None in log *    Tourniquet Times:         Implants:     Findings: complete fragmentation of left kidney stone  after 650 shocks, stent removed smoothly    Indications: Rj Adrian is an 60 y.o. male who is having surgery for Kidney stone on left side [N20.0]. Left ureteral stent inserted 6 days ago, now presenting for stone management.    The patient was seen in the preoperative area. The risks, benefits, complications, treatment options, non-operative alternatives, expected recovery and outcomes were discussed with the patient. The possibilities of reaction to medication, pulmonary aspiration, injury to surrounding structures, bleeding, recurrent infection, the need for additional procedures, failure to diagnose a condition, and creating a complication requiring transfusion or operation were discussed with the patient. The patient concurred with the proposed plan, giving informed consent.  The site of surgery was properly noted/marked if necessary per policy. The patient has been actively warmed in preoperative area. Preoperative antibiotics have been ordered and given within 1 hours of incision. Venous thrombosis prophylaxis have been ordered including bilateral sequential compression devices    Procedure Details: Patient was consented and antibiotics were administered in the preop area.   Under GA, with the patient in the supine position, X-ray was performed to visualize the stone prior to starting the procedure. After identifying the stone in the left kidney topography just adjacent to the ureteral stent, the ESWL machine with the shock wave generator and the gel filled cushion were applied on the site of the left flank. Shock waves were administered and as we were progressing with the procedure, we were noticing proper fragmentation of the stone. The patient started having cardiac arrhythmias after 650 shocks, by then the stone was completely fragmented. The ESWL part of the procedure was completed by then.  Set-up for cystoscopy was then made.  Was placed in dorsolithotomy position, genitalia was prepped and draped in  usual manner for cystoscopy.  #22 cystoscope was inserted down the urethra, and cystoscopy was performed revealing the ureteral stent out of the left ureteral orifice.  Using the stent grasper, the stent was pulled out completely.  This completed the procedure  Patient tolerated the procedure well, was awakened, and transferred to PACU in stable condition.    Evidence of Infection: No   Complications:  None; patient tolerated the procedure well.    Disposition: PACU - hemodynamically stable.  Condition: stable     Additional Details: After the ESWL shocks were stopped, the patient did not have any arrhythmias.  Discussion was made with the anesthesia team and considering the full recovery, it was deemed safe to discharge the patient home    Attending Attestation: I performed the procedure.    Eber Villalta  Phone Number: [phone]

## 2025-06-20 NOTE — PERIOPERATIVE NURSING NOTE
Patient having runs of ventricular rhythm per anesthesia.  ESWL slowed down.  Dr Day.      1256- physician in room. Patient eswl stopped.  Will remove stent via cystoscopy.  Anesthesia hanging fluids.  Vitals per anesthesia.      1300:  Anesthesia hanging fluids.  After procedure stopped showing no signs of ectopy on monitor.

## 2025-06-20 NOTE — INTERVAL H&P NOTE
H&P reviewed. The patient was examined and there are no changes to the H&P. No significant findings; reviewed medications and allergies; patient seen and discussed with attending physician responsible for performing the procedure. Heart and lung exam normal.

## 2025-06-20 NOTE — ANESTHESIA PROCEDURE NOTES
Airway  Date/Time: 6/20/2025 12:41 PM  Reason: elective    Airway not difficult    Staffing  Performed: CRNA   Authorized by: SHARLENE Mendez    Performed by: SHARLENE Mendez  Patient location during procedure: OR    Patient Condition  Indications for airway management: anesthesia  Patient position: sniffing  MILS maintained throughout  Sedation level: deep     Final Airway Details   Preoxygenated: yes  Final airway type: supraglottic airway  Successful airway:   Size: 4  Number of attempts at approach: 1  Number of other approaches attempted: 0

## 2025-06-20 NOTE — DISCHARGE INSTRUCTIONS
Tylenol and ibuprofen as needed for pain. Report to PCP if any chest pain, heart palpitations, or shortness of breath.  
Normal for race

## 2025-06-20 NOTE — POST-PROCEDURE NOTE
Mckenzie Roblero out to speak to patient and family about surgery as well and run of VTACH during procedure. Reiterated same informations that dr meza stated.

## 2025-06-20 NOTE — PERIOPERATIVE NURSING NOTE
Dr Villalta to speak to patient and his wife to update on surgery and patient going into short run of [facility]. Dr Villalta explained in detail surgery and recommended patient to follow up with cardiology and offered to send referral. Patient and wife state they understand. Patient is currently stable at this time.

## 2025-06-20 NOTE — ANESTHESIA POSTPROCEDURE EVALUATION
Patient: Rj Adrian    Procedure Summary       Date: 06/20/25 Room / Location: [facility] OR 02 / Virtual [facility] OR    Anesthesia Start: 1236 Anesthesia Stop: 1333    Procedures:       EXTRACORPOREAL SHOCK WAVE LITHOTRIPSY, POSSIBLE CYSTOSCOPY AND STENT REMOVAL (Left)      CYSTOSCOPY, WITH URETERAL STENT REMOVAL (Penis) Diagnosis:       Kidney stone on left side      (Kidney stone on left side [N20.0])    Surgeons: Eber Villalta MD Responsible Provider: SHARLENE Mendez    Anesthesia Type: general ASA Status: 2            Anesthesia Type: general    Vitals Value Taken Time   /67 06/20/25 13:31   Temp 35.8 °C (96.5 °F) 06/20/25 13:26   Pulse 77 06/20/25 13:31   Resp 16 06/20/25 13:31   SpO2 98 % 06/20/25 13:31       Anesthesia Post Evaluation    Patient location during evaluation: PACU  Patient participation: complete - patient participated  Level of consciousness: awake and alert  Pain score: 0  Pain management: adequate  Multimodal analgesia pain management approach  Airway patency: patent  Two or more strategies used to mitigate risk of obstructive sleep apnea  Cardiovascular status: acceptable and stable  Respiratory status: acceptable and room air  Hydration status: acceptable  Postoperative Nausea and Vomiting: none        Encounter Notable Events   Notable Event Outcome Phase Comment   Dysrhythmia Resolved in Room Intraprocedure ESWL-runs of PVC's, stopped lithotripsy, resolved

## 2025-06-25 ASSESSMENT — PAIN SCALES - GENERAL: PAINLEVEL_OUTOF10: 0 - NO PAIN

## 2025-06-26 ENCOUNTER — PATIENT OUTREACH (OUTPATIENT)
Dept: PRIMARY CARE | Facility: CLINIC | Age: 60
End: 2025-06-26
Payer: COMMERCIAL

## 2025-06-26 NOTE — PROGRESS NOTES
Unable to reach patient for follow up call after recent hospitalization.   Left voicemail with call back number for patient to call if needed.    If no voicemail available call attempts x 2 were made to contact the patient to assist with any questions or concerns patient may have.

## 2025-07-07 ENCOUNTER — TELEPHONE (OUTPATIENT)
Dept: PRIMARY CARE | Facility: CLINIC | Age: 60
End: 2025-07-07
Payer: COMMERCIAL

## 2025-07-07 DIAGNOSIS — I49.9 CARDIAC ARRHYTHMIA, UNSPECIFIED CARDIAC ARRHYTHMIA TYPE: ICD-10-CM

## 2025-07-07 DIAGNOSIS — F51.01 PRIMARY INSOMNIA: Primary | ICD-10-CM

## 2025-07-07 RX ORDER — MIRTAZAPINE 30 MG/1
30 TABLET, FILM COATED ORAL NIGHTLY
Qty: 30 TABLET | Refills: 2 | Status: SHIPPED | OUTPATIENT
Start: 2025-07-07 | End: 2025-10-05

## 2025-07-23 ENCOUNTER — APPOINTMENT (OUTPATIENT)
Dept: CARDIOLOGY | Facility: CLINIC | Age: 60
End: 2025-07-23
Payer: COMMERCIAL

## 2025-07-23 VITALS
WEIGHT: 140 LBS | BODY MASS INDEX: 21.93 KG/M2 | HEART RATE: 73 BPM | DIASTOLIC BLOOD PRESSURE: 91 MMHG | SYSTOLIC BLOOD PRESSURE: 148 MMHG | OXYGEN SATURATION: 96 %

## 2025-07-23 DIAGNOSIS — R03.0 ELEVATED BLOOD PRESSURE READING: ICD-10-CM

## 2025-07-23 DIAGNOSIS — I49.8 OTHER SPECIFIED CARDIAC ARRHYTHMIAS: Primary | ICD-10-CM

## 2025-07-23 DIAGNOSIS — R01.1 SYSTOLIC MURMUR: ICD-10-CM

## 2025-07-23 PROCEDURE — 93000 ELECTROCARDIOGRAM COMPLETE: CPT

## 2025-07-23 PROCEDURE — 99204 OFFICE O/P NEW MOD 45 MIN: CPT

## 2025-07-23 PROCEDURE — 1036F TOBACCO NON-USER: CPT

## 2025-07-23 NOTE — PATIENT INSTRUCTIONS
- Echocardiogram  - If symptoms appear, like palpitations, we will order a Holter monitoring  - Check Blood pressure at home and keep a registry  - Patient will follow up with me in the Cardiology, with a phone/virtual visit once the results are available    Monitor your blood pressure at home and keep a log of the readings    Steps to check your blood pressure at home    Be still> Don't smoke, drink caffeinated beverages or exercise within 30 minutes before measuring your blood pressure. Empty your bladder and ensure at least five minutes of quiet rest before measurements.   Sit correctly. Sit with your back straight and supported (on a dining chair, rather than a sofa). Your feet should be flat on the floor and your legs should not be crossed. Your arm should be supported on a flat surface, such as a table, with the upper arm at heart level. Make sure the bottom of the cuff is placed directly above the bend of the elbow. Check your monitor's instructions for an illustration or have your health care professional show you how.  Measure at the same time every day. It’s important to take the readings at the same time each day, such as morning and evening. It is best to take the readings daily, ideally beginning two weeks after a change in treatment and during the week before your next appointment.  Take multiple readings and record the results. Each time you measure, take two readings one minute apart and record the results using a printable (PDF) tracker. If your monitor has built-in memory to store your readings, take it with you to your appointments. Some monitors may also allow you to upload your readings to a secure website after you register your profile.  Don't take the measurement over clothes    You can review the information here  [email or web address]_[email or web address]

## 2025-07-23 NOTE — PROGRESS NOTES
Location of visit: [address]   Type of Visit: General Cardiology    Chief Complaint:  Patient was referred to Cardiology by Dr. Moe for No chief complaint on file..    History Of Present Illness:    Rj Adrian is a 60 y.o. male, with history significant for ankylosing spondylitis, ulcerative colitis s/p total colectomy, iron deficiency anemia, and recent UMM secondary to obstructing left UPJ stone, who visits Cardiology today as a new patient  for intraoperative arrhythmia.    The patient underwent ESWL and stent removal for a known 8 mm left ureteral stone with complete stone fragmentation after 650 shocks; during the procedure, he developed a transient ventricular arrhythmia, described as a short run of ventricular tachycardia (VT). Arrhythmia resolved spontaneously after cessation of shocks with no recurrence noted intraoperatively or in PACU. He was hemodynamically stable throughout and postoperatively. Troponins were within normal limits (peak 6 ng/L), and pre-op vitals were unremarkable. Baseline ECG and further rhythm monitoring are recommended. 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  Normal functional capacity.  He does not smoke, drinks socially, no drugs.  Exercise regularly at the gym 6 times a week.  He denies chest pain, dyspnea on exertion, shortness of breath, orthopnea, PND, nocturia, edema, palpitations, dizziness, lightheadedness, syncope    Blood pressure today: 148/91 mmHg    Today's ECG shows sinus rhythm at 71 bpm, normal AV conduction, and normal ventricular repolarization.    06/13/25      Past Medical History:  He has a past medical history of Ankylosing spondylitis, Hyperlipidemia, Other iron deficiency anemias (10/15/2018), Ulcerative colitis, and Ulcerative colitis, unspecified, without complications (Multi) (10/14/2022).    Past Surgical History:  He has a past surgical history that  includes Total colectomy; Cystoscopy w/ ureteral stent placement (Left, 06/14/2025); and Lithotripsy.    Social History:  He reports that he has never smoked. He has never used smokeless tobacco. He reports current alcohol use. He reports that he does not use drugs.    Family History:  Family History[1]  Allergies:  Epinephrine and Infliximab    Outpatient Medications:  Current Outpatient Medications   Medication Instructions    [Paused] celecoxib (CELEBREX) 200 mg, oral, Daily PRN    EnbreL SureClick 50 mg/mL (1 mL) pen injector pen injection INJECT 50 MG (1 ML) UNDER THE SKIN ONCE A WEEK    mirtazapine (REMERON) 30 mg, oral, Nightly    multivitamin tablet Take by mouth.    traZODone (DESYREL)  mg, oral, Nightly PRN     Last Recorded Vitals:  There were no vitals filed for this visit.    Physical Exam:      6/20/2025     2:00 PM 6/20/2025     1:58 PM 6/20/2025     1:56 PM 6/20/2025     1:51 PM 6/20/2025     1:46 PM 6/20/2025     1:41 PM   Vitals   Systolic 129 134 134 132 140 136   Diastolic 88 86 87 88 101 88   BP Location Left arm  Left arm Left arm Left arm Left arm   Heart Rate 85 82 80 79 87 93   Temp  37.1 °C (98.7 °F)       Resp 16 16 16 16 16 16     Wt Readings from Last 5 Encounters:   06/20/25 68 kg (150 lb)   06/17/25 68 kg (150 lb)   06/13/25 65.6 kg (144 lb 11.2 oz)   05/19/25 64.9 kg (143 lb)   02/14/25 68 kg (150 lb)       Physical Exam  Vitals reviewed.   HENT:      Head: Normocephalic.      Mouth/Throat:      Pharynx: Oropharynx is clear.     Eyes:      Pupils: Pupils are equal, round, and reactive to light.       Cardiovascular:      Rate and Rhythm: Normal rate.      Pulses: Normal pulses.      Heart sounds: Murmur heard.      Systolic murmur is present with a grade of 3/6.   Pulmonary:      Effort: Pulmonary effort is normal.      Breath sounds: Normal breath sounds.   Abdominal:      General: Abdomen is flat. Bowel sounds are normal.      Palpations: Abdomen is soft.     Musculoskeletal:   "       General: Normal range of motion.     Skin:     General: Skin is warm and dry.     Neurological:      General: No focal deficit present.      Mental Status: He is alert.     Psychiatric:         Mood and Affect: Mood normal.            @PESEC->PESEC(CIRCULAR REFERENCE)@      Last Labs Reviewed:  CBC -  Recent Labs     06/14/25  0604 06/13/25 1943 01/05/24  0842   WBC 7.5 11.0 5.8   HGB 10.5* 11.8* 13.6   HCT 33.5* 36.9* 41.4    319 310   MCV 80 79* 83     CMP -  Recent Labs     06/14/25 0604 06/13/25 1943 01/05/24  0842    138 140   K 3.4* 4.1 3.9    104 105   CO2 25 26 24   ANIONGAP 11 12 15   BUN 14 13 11   CREATININE 1.57* 1.44* 0.83   EGFR 51* 56* >90   MG 1.64 1.73  --    CALCIUM 7.8* 8.7 8.6     Recent Labs     06/13/25 1943 01/05/24  0842   ALBUMIN 3.8 3.8   ALKPHOS 62 69   ALT 14 12   AST 23 18   BILITOT 0.3 0.3   LIPASE 27  --      LIPID PANEL -   Recent Labs     01/05/24  0842   CHOL 181   LDLCALC 119*   HDL 46.0   TRIG 81     COAGULATION PANEL -  No results for input(s): \"PTT\", \"INR\", \"HAUF\", \"DDIMERVTE\", \"HAPTOGLOBIN\", \"FIBRINOGEN\" in the last 97459 hours.  HEME/ENDO -  Recent Labs     01/05/24  0842   TSH 1.92     CARDIOVASCULAR  Recent Labs     06/13/25  2100 06/13/25  1943   TROPHS 5 6     Last Cardiology/Imaging Tests Personally Reviewed (if images available) and Interpreted:  ECG:  Encounter Date: 06/13/25   ECG 12 lead   Result Value    Ventricular Rate 74    Atrial Rate 74    WY Interval 102    QRS Duration 74    QT Interval 382    QTC Calculation(Bazett) 424    P Axis 41    R Axis 1    T Axis 30    QRS Count 12    Q Onset 224    P Onset 173    P Offset 204    T Offset 415    QTC Fredericia 410    Narrative    Sinus rhythm with short WY  Minimal voltage criteria for LVH, may be normal variant ( R in aVL )  Borderline ECG  No previous ECGs available  See ED provider note for full interpretation and clinical correlation  Confirmed by Maye Iyer ([id number]) on " 7/18/2025 11:22:33 AM   ECG 12 lead 06/13/2025    Echocardiogram:  No results found for this or any previous visit from the past 1825 days.  No results found for this or any previous visit from the past 1095 days.    Cath:  No results found for this or any previous visit from the past 1825 days.  No results found for this or any previous visit from the past 3650 days.  No results found for this or any previous visit from the past 1095 days.    Stress Test:  No results found for this or any previous visit from the past 1825 days.  No results found for this or any previous visit from the past 1095 days.    Cardiac CT/MRI:  No results found for this or any previous visit from the past 1825 days.  No results found for this or any previous visit from the past 1095 days.    Other CT:      CV RISK FACTORS:   # Hypertension: Last BP:  .  # Hyperlipidemia: Last Tchol No results found for requested labs within last 365 days. / LDL No results found for requested labs within last 365 days. / HDL No results found for requested labs within last 365 days. / TRIG No results found for requested labs within last 365 days. (No results in last year.).  # Type II Diabetes Mellitus: Last A1c No results found for requested labs within last 365 days. (No results in last year.).  # Obesity: Last BMI:  .  # CKD: Last BUN/Cr (GFR): 14/1.57 (51), 6/14/2025:  6:04 AM.    ASCV RISK:  The ASCVD Risk score (Nakul NEVES, et al., 2019) failed to calculate for the following reasons:    Risk score cannot be calculated because patient has a medical history suggesting prior/existing ASCVD    Assessment/Plan   Rj Adrian is a 60 y.o. male, with history significant for ankylosing spondylitis, ulcerative colitis s/p total colectomy, iron deficiency anemia, and recent UMM secondary to obstructing left UPJ stone, who visits Cardiology today as a new patient  for intraoperative arrhythmia.    #Ventricular arrhythmia  #Elevated blood pressure reading  #systolic  murmur    Assessment and plan  60M with history of ankylosing spondylitis, ulcerative colitis s/p total colectomy, iron deficiency anemia, and recent UMM due to obstructing UPJ stone presents for evaluation of a transient ventricular arrhythmia noted intraoperatively during ESWL. He experienced a brief, self limited run of ventricular tachycardia during shockwave delivery, resolving spontaneously without hemodynamic compromise or recurrence. No cardiac symptoms pre or post procedure, and troponin remained within normal limits. Likely etiology is stimulation during procedure, potentially exacerbated by mild hypokalemia and low-normal magnesium. No personal or family history of structural or inherited cardiac disease; baseline ECG shows normal sinus rhythm with no repolarization abnormalities. Physical exam notable for elevated BP (148/91) and a systolic murmur. Plan: obtain transthoracic echocardiogram to assess for structural abnormalities that could underlie the murmur or arrhythmia. Holter monitoring deferred unless symptoms such as palpitations or syncope develop. Advised home BP monitoring with documentation, as isolated elevated reading may reflect situational hypertension but requires follow-up. Patient to follow up via virtual/phone visit to review echocardiogram findings and guide further management.    Recommendations:  - Echocardiogram  - If symptoms appear, like palpitations, we will order a Holter monitoring  - Check Blood pressure at home and keep a registry  - Patient will follow up with me in the Cardiology, with a phone/virtual visit once the results are available  - I spent 45 minutes assessing the case between pre-charting, face-to-face patient interaction, and documentation    Lio Tadeo MD          [1] No family history on file.

## 2025-07-29 ENCOUNTER — TELEPHONE (OUTPATIENT)
Dept: PRIMARY CARE | Facility: CLINIC | Age: 60
End: 2025-07-29
Payer: COMMERCIAL

## 2025-07-29 DIAGNOSIS — H00.019 HORDEOLUM EXTERNUM, UNSPECIFIED LATERALITY: Primary | ICD-10-CM

## 2025-07-29 RX ORDER — TOBRAMYCIN 3 MG/ML
2 SOLUTION/ DROPS OPHTHALMIC 4 TIMES DAILY
Qty: 5 ML | Refills: 0 | Status: SHIPPED | OUTPATIENT
Start: 2025-07-29 | End: 2025-08-05

## 2025-07-29 NOTE — TELEPHONE ENCOUNTER
Left voicemail stating he has a stye. Would like eye drops to clear it up. Has a presentation on Monday. [facility] on [facility].

## 2025-08-20 ENCOUNTER — HOSPITAL ENCOUNTER (OUTPATIENT)
Dept: CARDIOLOGY | Facility: HOSPITAL | Age: 60
Discharge: HOME | End: 2025-08-20
Payer: COMMERCIAL

## 2025-08-20 DIAGNOSIS — I49.8 OTHER SPECIFIED CARDIAC ARRHYTHMIAS: ICD-10-CM

## 2025-08-20 DIAGNOSIS — R03.0 ELEVATED BLOOD PRESSURE READING: ICD-10-CM

## 2025-08-20 DIAGNOSIS — R01.1 SYSTOLIC MURMUR: ICD-10-CM

## 2025-08-20 LAB
AORTIC VALVE MEAN GRADIENT: 5 MMHG
AORTIC VALVE PEAK VELOCITY: 1.67 M/S
AV PEAK GRADIENT: 11 MMHG
AVA (PEAK VEL): 2.79 CM2
AVA (VTI): 2.98 CM2
EJECTION FRACTION APICAL 4 CHAMBER: 52.2
EJECTION FRACTION: 58 %
LEFT ATRIUM VOLUME AREA LENGTH INDEX BSA: 14.9 ML/M2
LEFT VENTRICLE INTERNAL DIMENSION DIASTOLE: 4.41 CM (ref 3.5–6)
LEFT VENTRICULAR OUTFLOW TRACT DIAMETER: 2.26 CM
MITRAL VALVE E/A RATIO: 0.82
RIGHT VENTRICLE FREE WALL PEAK S': 13 CM/S
RIGHT VENTRICLE PEAK SYSTOLIC PRESSURE: 22 MMHG
TRICUSPID ANNULAR PLANE SYSTOLIC EXCURSION: 2.2 CM

## 2025-08-20 PROCEDURE — 93306 TTE W/DOPPLER COMPLETE: CPT

## 2025-08-20 PROCEDURE — 93306 TTE W/DOPPLER COMPLETE: CPT | Performed by: STUDENT IN AN ORGANIZED HEALTH CARE EDUCATION/TRAINING PROGRAM

## 2025-08-28 ENCOUNTER — APPOINTMENT (OUTPATIENT)
Dept: CARDIOLOGY | Facility: CLINIC | Age: 60
End: 2025-08-28
Payer: COMMERCIAL

## 2025-09-10 ENCOUNTER — APPOINTMENT (OUTPATIENT)
Facility: CLINIC | Age: 60
End: 2025-09-10
Payer: COMMERCIAL

## (undated) DEVICE — Device

## (undated) DEVICE — PREP TRAY, SKIN, DRY, W/GLOVES

## (undated) DEVICE — TUBING, SUCTION, CONNECTING, NON-CONDUCTIVE, SURE GRIP CONNECTORS, 3/16 IN X 10 FT

## (undated) DEVICE — SOLUTION, IRRIGATION, STERILE WATER, USP, 3000ML, TITAN XL

## (undated) DEVICE — COLLECTION BAG, FLUID, NON-STERILE

## (undated) DEVICE — GUIDEWIRE, ULTRA TRACK, HYBRID, 0.035 IN X 150CM

## (undated) DEVICE — MARKER, SKIN, DUAL TIP INK W/9 LABEL AND REMOVABLE TIME OUT SLEEVE

## (undated) DEVICE — IRRIGATION SET, CYSTOSCOPY, TURP, Y, CONTINUOUS, 81 IN

## (undated) DEVICE — STOPCOCK, SAN ANTONIO, W/MODIFIED FITTING

## (undated) DEVICE — GUIDEWIRE, ULTRA TRACK, HYBRID, 0.038 IN STRAIGHT TIP

## (undated) DEVICE — CATHETER, URETERAL, POLLACK, OPEN END, 5.5 FR, 70 CM